# Patient Record
Sex: MALE | Race: AMERICAN INDIAN OR ALASKA NATIVE
[De-identification: names, ages, dates, MRNs, and addresses within clinical notes are randomized per-mention and may not be internally consistent; named-entity substitution may affect disease eponyms.]

---

## 2018-07-16 ENCOUNTER — HOSPITAL ENCOUNTER (EMERGENCY)
Dept: HOSPITAL 42 - ED | Age: 58
Discharge: HOME | End: 2018-07-16
Payer: COMMERCIAL

## 2018-07-16 VITALS — RESPIRATION RATE: 19 BRPM | TEMPERATURE: 97.9 F

## 2018-07-16 VITALS — SYSTOLIC BLOOD PRESSURE: 137 MMHG | DIASTOLIC BLOOD PRESSURE: 82 MMHG | HEART RATE: 55 BPM

## 2018-07-16 VITALS — OXYGEN SATURATION: 98 %

## 2018-07-16 DIAGNOSIS — R42: ICD-10-CM

## 2018-07-16 DIAGNOSIS — R51: ICD-10-CM

## 2018-07-16 DIAGNOSIS — I10: Primary | ICD-10-CM

## 2018-07-16 LAB
ALBUMIN SERPL-MCNC: 3.9 G/DL (ref 3–4.8)
ALBUMIN/GLOB SERPL: 1.3 {RATIO} (ref 1.1–1.8)
ALT SERPL-CCNC: 34 U/L (ref 7–56)
APPEARANCE UR: CLEAR
APTT BLD: 31.4 SECONDS (ref 25.1–36.5)
AST SERPL-CCNC: 34 U/L (ref 17–59)
BACTERIA #/AREA URNS HPF: (no result) /[HPF]
BASOPHILS # BLD AUTO: 0.03 K/MM3 (ref 0–2)
BASOPHILS NFR BLD: 0.5 % (ref 0–3)
BILIRUB UR-MCNC: NEGATIVE MG/DL
BUN SERPL-MCNC: 11 MG/DL (ref 7–21)
CALCIUM SERPL-MCNC: 9.1 MG/DL (ref 8.4–10.5)
CK MB SERPL-MCNC: 1.8 NG/ML (ref 0–3.6)
COLOR UR: (no result)
EOSINOPHIL # BLD: 0.1 10*3/UL (ref 0–0.7)
EOSINOPHIL NFR BLD: 0.8 % (ref 1.5–5)
EPI CELLS #/AREA URNS HPF: (no result) /HPF (ref 0–5)
ERYTHROCYTE [DISTWIDTH] IN BLOOD BY AUTOMATED COUNT: 12.3 % (ref 11.5–14.5)
GFR NON-AFRICAN AMERICAN: > 60
GLUCOSE UR STRIP-MCNC: NEGATIVE MG/DL
GRANULOCYTES # BLD: 3.2 10*3/UL (ref 1.4–6.5)
GRANULOCYTES NFR BLD: 50.7 % (ref 50–68)
HGB BLD-MCNC: 15.2 G/DL (ref 14–18)
INR PPP: 0.95 (ref 0.93–1.08)
LEUKOCYTE ESTERASE UR-ACNC: NEGATIVE LEU/UL
LYMPHOCYTES # BLD: 2.4 10*3/UL (ref 1.2–3.4)
LYMPHOCYTES NFR BLD AUTO: 38 % (ref 22–35)
MCH RBC QN AUTO: 32.9 PG (ref 25–35)
MCHC RBC AUTO-ENTMCNC: 35.6 G/DL (ref 31–37)
MCV RBC AUTO: 92.4 FL (ref 80–105)
MONOCYTES # BLD AUTO: 0.6 10*3/UL (ref 0.1–0.6)
MONOCYTES NFR BLD: 10 % (ref 1–6)
PH UR STRIP: 6 [PH] (ref 4.7–8)
PLATELET # BLD: 255 10^3/UL (ref 120–450)
PMV BLD AUTO: 9.1 FL (ref 7–11)
PROT UR STRIP-MCNC: NEGATIVE MG/DL
PROTHROMBIN TIME: 10.8 SECONDS (ref 9.4–12.5)
RBC # BLD AUTO: 4.62 10^6/UL (ref 3.5–6.1)
RBC # UR STRIP: (no result) /UL
RBC #/AREA URNS HPF: (no result) /HPF (ref 0–2)
SP GR UR STRIP: <= 1.005 (ref 1–1.03)
TROPONIN I SERPL-MCNC: < 0.01 NG/ML
UROBILINOGEN UR STRIP-ACNC: 0.2 E.U./DL
WBC # BLD AUTO: 6.3 10^3/UL (ref 4.5–11)
WBC #/AREA URNS HPF: NEGATIVE /HPF (ref 0–6)

## 2018-07-16 PROCEDURE — 85610 PROTHROMBIN TIME: CPT

## 2018-07-16 PROCEDURE — 85730 THROMBOPLASTIN TIME PARTIAL: CPT

## 2018-07-16 PROCEDURE — 96360 HYDRATION IV INFUSION INIT: CPT

## 2018-07-16 PROCEDURE — 85025 COMPLETE CBC W/AUTO DIFF WBC: CPT

## 2018-07-16 PROCEDURE — 99284 EMERGENCY DEPT VISIT MOD MDM: CPT

## 2018-07-16 PROCEDURE — 83615 LACTATE (LD) (LDH) ENZYME: CPT

## 2018-07-16 PROCEDURE — 84484 ASSAY OF TROPONIN QUANT: CPT

## 2018-07-16 PROCEDURE — 82553 CREATINE MB FRACTION: CPT

## 2018-07-16 PROCEDURE — 80053 COMPREHEN METABOLIC PANEL: CPT

## 2018-07-16 PROCEDURE — 93005 ELECTROCARDIOGRAM TRACING: CPT

## 2018-07-16 PROCEDURE — 70450 CT HEAD/BRAIN W/O DYE: CPT

## 2018-07-16 PROCEDURE — 81001 URINALYSIS AUTO W/SCOPE: CPT

## 2018-07-16 PROCEDURE — 82550 ASSAY OF CK (CPK): CPT

## 2018-07-16 NOTE — ED PDOC
Arrival/HPI





- General


Chief Complaint: High Blood Pressure


Time Seen by Provider: 07/16/18 18:43


Historian: Patient





- History of Present Illness


Narrative History of Present Illness (Text): 





07/16/18 19:43


59yo male with no pmhx who was referred to ED from Urgent care for dizziness, 

headache and elevated BP. Patient reports 2days history of  dizziness and 

headache. States he went to  today for the symptoms, and while there he was 

told his BP was elevated. He described dizziness, as lightheadedness. Denies 

focal weakness, nausea, vomiting, focal weakness, ripping/tearing upper back 

pain, visual changes, abdominal pain, chest pain, slurred speech, any other 

complaint.





Past Medical History





- Provider Review


Nursing Documentation Reviewed: Yes





- Infectious Disease


Hx of Infectious Diseases: None





- Cardiac


Hx Cardiac Disorders: No


Hx Hypertension: Yes





- Pulmonary


Hx Respiratory Disorders: No





- HEENT


Hx HEENT Disorder: No





- Endocrine/Metabolic


Hx Endocrine Disorders: No





- Hematological/Oncological


Hx Blood Disorders: No





- Integumentary


Hx Dermatological Disorder: No





- Musculoskeletal/Rheumatological


Hx Musculoskeletal Disorders: No





- Gastrointestinal


Hx Gastrointestinal Disorders: No





- Genitourinary/Gynecological


Hx Genitourinary Disorders: No





- Psychiatric


Hx Psychophysiologic Disorder: No


Hx Substance Use: No





- Surgical History


Other/Comment: abd surgery





- Anesthesia


Hx Anesthesia: Yes


Hx Anesthesia Reactions: No





Family/Social History





- Physician Review


Nursing Documentation Reviewed: Yes


Family/Social History: Unknown Family HX


Smoking Status: Unknown If Ever Smoked


Hx Alcohol Use: Yes


Frequency of alcohol use: Socially


Hx Substance Use: No





Allergies/Home Meds


Allergies/Adverse Reactions: 


Allergies





No Known Allergies Allergy (Verified 07/16/18 18:42)


 











Review of Systems





- Physician Review


All systems were reviewed & negative as marked: Yes





- Review of Systems


Constitutional: Normal


Eyes: Normal


ENT: Normal


Respiratory: Normal


Cardiovascular: Normal


Gastrointestinal: Normal


Genitourinary Male: Normal


Musculoskeletal: Normal


Skin: Normal


Neurological: Headache, Dizziness.  absent: Focal Weakness, Gait Changes, 

Speech Changes, Facial Droop


Endocrine: Normal


Hemo/Lymphatic: Normal


Psychiatric: Normal





Physical Exam


Vital Signs Reviewed: Yes


Vital Signs











  Temp Pulse Resp BP Pulse Ox


 


 07/16/18 20:54   55 L   137/82 


 


 07/16/18 18:55   64  18  174/70 H  98


 


 07/16/18 18:36  99 F  65  18  180/75 H  98











Temperature: Afebrile


Blood Pressure: Hypertensive


Pulse: Regular


Respiratory Rate: Normal


Appearance: Positive for: Well-Appearing, Non-Toxic, Comfortable


Pain Distress: None


Mental Status: Positive for: Alert and Oriented X 3





- Systems Exam


Head: Present: Atraumatic, Normocephalic


Pupils: Present: PERRL


Extroacular Muscles: Present: EOMI


Conjunctiva: Present: Normal


Mouth: Present: Moist Mucous Membranes


Neck: Present: Normal Range of Motion


Respiratory/Chest: Present: Clear to Auscultation, Good Air Exchange.  No: 

Respiratory Distress, Accessory Muscle Use


Cardiovascular: Present: Regular Rate and Rhythm, Normal S1, S2.  No: Murmurs


Abdomen: No: Tenderness, Distention, Peritoneal Signs


Back: Present: Normal Inspection


Upper Extremity: Present: Normal Inspection.  No: Cyanosis, Edema


Lower Extremity: Present: Normal Inspection.  No: Edema


Neurological: Present: GCS=15, CN II-XII Intact, Speech Normal, Motor Func 

Grossly Intact, Normal Sensory Function, Normal Cerebellar Funct, Norm Deep 

Tendon Reflexes, Gait Normal, Memory Normal, Normal 2Pt Descrimination, Other (

No focal neurological deficit)


Skin: Present: Warm, Dry, Normal Color.  No: Rashes


Psychiatric: Present: Alert, Oriented x 3, Normal Insight, Normal Concentration





Medical Decision Making


ED Course and Treatment: 





07/16/18 21:04


PT presented for stated history. He was neurologically intact in ED. His lab 

was unremarkable





EKG NSR with LVH @ 62bpm





He was treated in ED with medication and in re evaluation he notes that his 

headache resolved.





Head CT - Negative





His BP improved. Result was DW the pt and he was advised to f/u with his PMD/

Neuro for further outpt evaluation. Amlodipine rx was given for his pressure.





- Lab Interpretations


Lab Results: 








 07/16/18 19:10 





 07/16/18 19:10 





 Lab Results





07/16/18 19:10: Sodium 143, Potassium 4.2, Chloride 104, Carbon Dioxide 31, 

Anion Gap 13, BUN 11, Creatinine 0.8, Est GFR (African Amer) > 60, Est GFR (Non-

Af Amer) > 60, Random Glucose 106, Calcium 9.1, Total Bilirubin 0.4, AST 34, 

ALT 34, Alkaline Phosphatase 59, Lactate Dehydrogenase 661, Total Creatine 

Kinase 539 H, CK-MB (CK-2) 1.8, CK-MB (CK-2) % Cancelled, Troponin I < 0.01, 

Total Protein 6.9, Albumin 3.9, Globulin 3.0, Albumin/Globulin Ratio 1.3


07/16/18 19:10: Urine Color Light yellow, Urine Appearance Clear, Urine pH 6.0, 

Ur Specific Gravity <= 1.005, Urine Protein Negative, Urine Glucose (UA) 

Negative, Urine Ketones Negative, Urine Blood Trace-intact H, Urine Nitrate 

Negative, Urine Bilirubin Negative, Urine Urobilinogen 0.2, Ur Leukocyte 

Esterase Negative, Urine RBC 0 - 2, Urine WBC Negative, Ur Epithelial Cells None

, Urine Bacteria None


07/16/18 19:10: PT 10.8, INR 0.95, APTT 31.4


07/16/18 19:10: WBC 6.3, RBC 4.62, Hgb 15.2, Hct 42.7, MCV 92.4, MCH 32.9, MCHC 

35.6, RDW 12.3, Plt Count 255, MPV 9.1, Gran % 50.7, Lymph % (Auto) 38.0 H, 

Mono % (Auto) 10.0 H, Eos % (Auto) 0.8 L, Baso % (Auto) 0.5, Gran # 3.20, Lymph 

# (Auto) 2.4, Mono # (Auto) 0.6, Eos # (Auto) 0.1, Baso # (Auto) 0.03











- RAD Interpretation


Radiology Orders: 








07/16/18 18:49


HEAD W/O CONTRAST [CT] Stat 














- Medication Orders


Current Medication Orders: 











Discontinued Medications





Acetaminophen (Tylenol 325mg Tab)  650 mg PO STAT STA


   Stop: 07/16/18 18:50


   Last Admin: 07/16/18 19:06  Dose: 650 mg





MAR Pain/Vitals


 Document     07/16/18 19:06  OCS  (Rec: 07/16/18 19:07  OCS  [facility] [place]


     Pain Reassessment


      Is This A Pain ReAssessment?               No


     Sleep


      Is patient sleeping during reassessment?   No


     Presence of Pain


      Presence of Pain                           Yes


     Pain Scale Used


      Pain Scale Used                            Numeric


     Location


      Pain Location Body Site                    Chest


      Description                                Constant


      Aggravating Factors                        ADL's





Amlodipine Besylate (Norvasc)  5 mg PO STAT STA


   Stop: 07/16/18 19:54


   Last Admin: 07/16/18 20:54  Dose: 5 mg





MAR Pulse and Blood Pressure


 Document     07/16/18 20:54  OCS  (Rec: 07/16/18 20:54  OCS  [facility]


     Pulse


      Pulse Rate (60-90 beats/min)               55


     Blood Pressure


      Blood Pressure (100//90 mm Hg)       137/82





Metoclopramide HCl 10 mg/ (Sodium Chloride)  52 mls @ 200 mls/hr IV STAT STA


   Stop: 07/16/18 19:04


   Last Admin: 07/16/18 19:07  Dose: 200 mls/hr





eMAR Start Stop


 Document     07/16/18 19:07  OCS  (Rec: 07/16/18 19:08  OCS  [facility]


     Intravenous Solution


      Start Date                                 07/16/18


      Start Time                                 19:07


      End Date                                   07/16/18


      End time                                   19:22


      Total Infusion Time                        15





Sodium Chloride (Sodium Chloride 0.9%)  1,000 mls @ 999 mls/hr IV .Q1H1M STA


   Stop: 07/16/18 19:51


   Last Admin: 07/16/18 19:08  Dose: 999 mls/hr





eMAR Start Stop


 Document     07/16/18 19:08  OCS  (Rec: 07/16/18 19:09  OCS  [facility]


     Intravenous Solution


      Start Date                                 07/16/18


      Start Time                                 19:08


      End Date                                   07/16/18


      End time                                   20:09


      Total Infusion Time                        61














Disposition/Present on Arrival





- Present on Arrival


Any Indicators Present on Arrival: No


History of DVT/PE: No


History of Uncontrolled Diabetes: No


Urinary Catheter: No


History of Decub. Ulcer: No


History Surgical Site Infection Following: None





- Disposition


Have Diagnosis and Disposition been Completed?: Yes


Diagnosis: 


 Dizziness, Headache, Hypertension





Disposition: HOME/ ROUTINE


Disposition Time: 21:00


Patient Plan: Discharge


Condition: STABLE


Discharge Instructions (ExitCare):  Headache, Adult, High Blood Pressure in 

Adults, Controlling Your Blood Pressure Through Lifestyle, Dizziness, Nonvertigo

, (DC)


Additional Instructions: 


Follow up with your Doctor/Neurologist


Return to ED for any new or worsening symptoms


Prescriptions: 


amLODIPine [Norvasc] 5 mg PO DAILY #10 tab


Referrals: 


Shanell Yepez MD [Primary Care Provider] - Follow up with primary


Forms:  [employer] (English)

## 2018-07-17 NOTE — CT
Date of service: 



07/16/2018



PROCEDURE:  CT HEAD WITHOUT CONTRAST.



HISTORY:

headache/dizziness



COMPARISON:

None available. 



TECHNIQUE:

Axial computed tomography images were obtained through the head/brain 

without intravenous contrast.  



Radiation dose:



Total exam DLP = 1165 mGy-cm.



This CT exam was performed using one or more of the following dose 

reduction techniques: Automated exposure control, adjustment of the 

mA and/or kV according to patient size, and/or use of iterative 

reconstruction technique.



FINDINGS:



HEMORRHAGE:

No intracranial hemorrhage. 



BRAIN:

No mass effect or edema.  No atrophy or chronic microvascular 

ischemic changes.



VENTRICLES:

Unremarkable. No hydrocephalus. 



CALVARIUM:

Unremarkable.



PARANASAL SINUSES:

Unremarkable as visualized. No significant inflammatory changes.



MASTOID AIR CELLS:

Unremarkable as visualized. No inflammatory changes.



OTHER FINDINGS:

The report concurs with the preliminary [facility] report



IMPRESSION:

No acute finding

## 2018-07-18 ENCOUNTER — HOSPITAL ENCOUNTER (OUTPATIENT)
Dept: HOSPITAL 31 - C.ER | Age: 58
Setting detail: OBSERVATION
LOS: 2 days | Discharge: HOME | End: 2018-07-20
Attending: INTERNAL MEDICINE | Admitting: INTERNAL MEDICINE
Payer: COMMERCIAL

## 2018-07-18 VITALS — RESPIRATION RATE: 20 BRPM

## 2018-07-18 DIAGNOSIS — K26.9: ICD-10-CM

## 2018-07-18 DIAGNOSIS — Z79.899: ICD-10-CM

## 2018-07-18 DIAGNOSIS — Z82.3: ICD-10-CM

## 2018-07-18 DIAGNOSIS — Z79.82: ICD-10-CM

## 2018-07-18 DIAGNOSIS — R42: Primary | ICD-10-CM

## 2018-07-18 DIAGNOSIS — I63.9: ICD-10-CM

## 2018-07-18 DIAGNOSIS — Z87.11: ICD-10-CM

## 2018-07-18 DIAGNOSIS — I10: ICD-10-CM

## 2018-07-18 LAB
ALBUMIN SERPL-MCNC: 4.6 G/DL (ref 3.5–5)
ALBUMIN/GLOB SERPL: 1.5 {RATIO} (ref 1–2.1)
ALT SERPL-CCNC: 37 U/L (ref 21–72)
AST SERPL-CCNC: 44 U/L (ref 17–59)
BASOPHILS # BLD AUTO: 0 K/UL (ref 0–0.2)
BASOPHILS NFR BLD: 0.7 % (ref 0–2)
BILIRUB UR-MCNC: NEGATIVE MG/DL
BNP SERPL-MCNC: 63.7 PG/ML (ref 0–900)
BUN SERPL-MCNC: 6 MG/DL (ref 9–20)
CALCIUM SERPL-MCNC: 9.7 MG/DL (ref 8.6–10.4)
EOSINOPHIL # BLD AUTO: 0.1 K/UL (ref 0–0.7)
EOSINOPHIL NFR BLD: 1.1 % (ref 0–4)
ERYTHROCYTE [DISTWIDTH] IN BLOOD BY AUTOMATED COUNT: 12.5 % (ref 11.5–14.5)
GFR NON-AFRICAN AMERICAN: > 60
GLUCOSE UR STRIP-MCNC: NORMAL MG/DL
HDLC SERPL-MCNC: 43 MG/DL (ref 30–70)
HGB BLD-MCNC: 16.7 G/DL (ref 12–18)
LDLC SERPL-MCNC: 138 MG/DL (ref 0–129)
LEUKOCYTE ESTERASE UR-ACNC: (no result) LEU/UL
LYMPHOCYTES # BLD AUTO: 3.1 K/UL (ref 1–4.3)
LYMPHOCYTES NFR BLD AUTO: 49.2 % (ref 20–40)
MCH RBC QN AUTO: 33.6 PG (ref 27–31)
MCHC RBC AUTO-ENTMCNC: 34.8 G/DL (ref 33–37)
MCV RBC AUTO: 96.6 FL (ref 80–94)
MONOCYTES # BLD: 0.8 K/UL (ref 0–0.8)
MONOCYTES NFR BLD: 12.3 % (ref 0–10)
NEUTROPHILS # BLD: 2.3 K/UL (ref 1.8–7)
NEUTROPHILS NFR BLD AUTO: 36.7 % (ref 50–75)
NRBC BLD AUTO-RTO: 0.1 % (ref 0–2)
PH UR STRIP: 6 [PH] (ref 5–8)
PLATELET # BLD: 306 K/UL (ref 130–400)
PMV BLD AUTO: 7.8 FL (ref 7.2–11.7)
PROT UR STRIP-MCNC: NEGATIVE MG/DL
RBC # BLD AUTO: 4.96 MIL/UL (ref 4.4–5.9)
RBC # UR STRIP: NEGATIVE /UL
SP GR UR STRIP: 1 (ref 1–1.03)
UROBILINOGEN UR-MCNC: NORMAL MG/DL (ref 0.2–1)
WBC # BLD AUTO: 6.4 K/UL (ref 4.8–10.8)

## 2018-07-18 PROCEDURE — 70450 CT HEAD/BRAIN W/O DYE: CPT

## 2018-07-18 PROCEDURE — 97162 PT EVAL MOD COMPLEX 30 MIN: CPT

## 2018-07-18 PROCEDURE — 36415 COLL VENOUS BLD VENIPUNCTURE: CPT

## 2018-07-18 PROCEDURE — 84443 ASSAY THYROID STIM HORMONE: CPT

## 2018-07-18 PROCEDURE — 80061 LIPID PANEL: CPT

## 2018-07-18 PROCEDURE — 70496 CT ANGIOGRAPHY HEAD: CPT

## 2018-07-18 PROCEDURE — 85025 COMPLETE CBC W/AUTO DIFF WBC: CPT

## 2018-07-18 PROCEDURE — 81001 URINALYSIS AUTO W/SCOPE: CPT

## 2018-07-18 PROCEDURE — 97116 GAIT TRAINING THERAPY: CPT

## 2018-07-18 PROCEDURE — 97165 OT EVAL LOW COMPLEX 30 MIN: CPT

## 2018-07-18 PROCEDURE — 84100 ASSAY OF PHOSPHORUS: CPT

## 2018-07-18 PROCEDURE — 97530 THERAPEUTIC ACTIVITIES: CPT

## 2018-07-18 PROCEDURE — 93005 ELECTROCARDIOGRAM TRACING: CPT

## 2018-07-18 PROCEDURE — 70551 MRI BRAIN STEM W/O DYE: CPT

## 2018-07-18 PROCEDURE — 71045 X-RAY EXAM CHEST 1 VIEW: CPT

## 2018-07-18 PROCEDURE — 99285 EMERGENCY DEPT VISIT HI MDM: CPT

## 2018-07-18 PROCEDURE — 84484 ASSAY OF TROPONIN QUANT: CPT

## 2018-07-18 PROCEDURE — 80053 COMPREHEN METABOLIC PANEL: CPT

## 2018-07-18 PROCEDURE — 70498 CT ANGIOGRAPHY NECK: CPT

## 2018-07-18 PROCEDURE — 83735 ASSAY OF MAGNESIUM: CPT

## 2018-07-18 PROCEDURE — 85378 FIBRIN DEGRADE SEMIQUANT: CPT

## 2018-07-18 PROCEDURE — 83880 ASSAY OF NATRIURETIC PEPTIDE: CPT

## 2018-07-18 PROCEDURE — 93306 TTE W/DOPPLER COMPLETE: CPT

## 2018-07-18 NOTE — CP.PCM.HP
History of Present Illness





- History of Present Illness


History of Present Illness: 





H&P Dr. Singer's Hospitalist Service





CC: "Dizziness and elevated blood pressure"





HPI:58 year old male with a past medical history of hypertension and duodenal 

ulcer (s/p abdominal surgery) who comes in today complaining of dizziness and 

nausea since Sunday evening.  The patient states he was at rest initially when 

the dizziness began.  He also reports sitting up exacerbating the symptoms.  He 

denies any alleviating or modifying factors in regards to the dizziness.  

Initially when he first had the dizziness he went to an Urgent care were they 

referred him to the [facility] for further workup after his blood 

pressure came back elevated.  After the workup at [facility] he was 

referred to the Cardiologist Dr Bates for further workup as an outpatient. 

Of note, the patient was at his Cardiologist Dr. Hsieh in the waiting room 

when he "blacked out" and can't recall the events leading up to this.  In 

conjunction he also reports a posterior headache.  He denies any chest pain, 

shortness of breath, orthopnea, dyspnea, fevers, chills, cough, changes in 

vision, or any other complaints.





PMD: Dr. JODY Gross


Cardiologist: Dr. Hsieh





Past medical history: Hypertension, duodenal ulcer (s/p abdominal surgery)


Medications: Amlodipine 5mg PO Daily, Multivitamin, Baby aspirin 81mg PO Daily


Surgical history: Abdominal surgery ( 1985: [place])


Family history: Mom: CHANA @75 yrs, Brother : CHANA @49 yrs old


Social history: Admits social drinking Friday's and Saturday's. Denies smoking. 

Denies Illicit drug use. Works for [place] [employer]











Present on Admission





- Present on Admission


Any Indicators Present on Admission: No





Review of Systems





- Constitutional


Constitutional: absent: Daytime Sleepiness, Headache, Snoring, Weakness





- EENT


Eyes: absent: Blurred Vision, Discharge, Loss of Peripheral Vision, Sees Flashes

, Loss of Vision


Ears: Disequilibrium, Dizziness.  absent: Decreased Hearing, Ear Discharge, Ear 

Pain


Nose/Mouth/Throat: absent: Nasal Congestion, Nose Pain, Bleeding Gums, Facial 

Pain





- Cardiovascular


Cardiovascular: Syncope.  absent: Chest Pain, Claudication, Irregular Heart 

Rhythm, Leg Edema, Palpitations, Pedal Edema





- Respiratory


Respiratory: absent: Cough, Dyspnea, Hemoptysis, Snoring, Pain on Inspiration, 

Change in Mucous Color





- Gastrointestinal


Gastrointestinal: absent: Belching, Change in Stool Character, Fecal 

Incontinence, Melena, Nausea





- Musculoskeletal


Musculoskeletal: absent: Abnormal Gait, Arthralgias, Muscle Weakness, Myalgias, 

Stiffness, Tingling





- Neurological


Neurological: Syncope, Vertigo.  absent: Abnormal Hearing, Burning Sensations, 

Numbness, Loss of Vision, Restless Legs, Tingling





- Psychiatric


Psychiatric: absent: Behavioral Changes, Depression, Panic Attacks, Tactile 

Hallucinations





- Endocrine


Endocrine: absent: Polydipsia, Polyphagia, Polyuria





- Hematologic/Lymphatic


Hematologic: absent: Easy Bleeding, Easy Bruising





Past Patient History





- Past Social History


Smoking Status: Never Smoked





- CARDIAC


Hx Hypertension: Yes





- GASTROINTESTINAL


Hx Ulcer: Yes





- PSYCHIATRIC


Hx Substance Use: No





- SURGICAL HISTORY


Other/Comment: "ULCER SURGERY"





- ANESTHESIA


Hx Anesthesia: Yes


Hx Anesthesia Reactions: No





Meds


Allergies/Adverse Reactions: 


 Allergies











Allergy/AdvReac Type Severity Reaction Status Date / Time


 


ibuprofen [From Advil] Allergy   Verified 07/18/18 11:49














Physical Exam





- Head Exam


Head Exam: ATRAUMATIC, NORMAL INSPECTION, NORMOCEPHALIC





- Eye Exam


Eye Exam: EOMI, Normal appearance, PERRL


Pupil Exam: NORMAL ACCOMODATION





- ENT Exam


ENT Exam: Mucous Membranes Moist, Normal Oropharynx





- Respiratory Exam


Respiratory Exam: Clear to Auscultation Bilateral, NORMAL BREATHING PATTERN





- Cardiovascular Exam


Cardiovascular Exam: REGULAR RHYTHM, +S1, +S2.  absent: Tachycardia, RRR, Rubs, 

+S4, Systolic Murmur





- GI/Abdominal Exam


GI & Abdominal Exam: Normal Bowel Sounds, Soft.  absent: Distended, Firm, 

Hypoactive Bowel Sounds





- Extremities Exam


Extremities exam: Positive for: normal inspection.  Negative for: full ROM, 

pedal edema





- Back Exam


Back exam: NORMAL INSPECTION.  absent: CVA tenderness (L), CVA tenderness (R), 

paraspinal tenderness





- Neurological Exam


Neurological exam: Alert, CN II-XII Intact, Oriented x3





- Psychiatric Exam


Psychiatric exam: Normal Affect, Normal Mood





- Skin


Skin Exam: Dry, Intact, Normal Color





Results





- Vital Signs


Recent Vital Signs: 





 Last Vital Signs











Temp  98.5 F   07/18/18 15:19


 


Pulse  64   07/18/18 15:19


 


Resp  18   07/18/18 15:19


 


BP  145/79   07/18/18 15:19


 


Pulse Ox  98   07/18/18 15:19














- Labs


Result Diagrams: 


 07/18/18 12:16





 07/18/18 12:16


Labs: 





 Laboratory Results - last 24 hr











  07/18/18 07/18/18 07/18/18





  12:16 12:16 12:16


 


WBC  6.4  


 


RBC  4.96  


 


Hgb  16.7  


 


Hct  47.9  


 


MCV  96.6 H  


 


MCH  33.6 H  


 


MCHC  34.8  


 


RDW  12.5  


 


Plt Count  306  


 


MPV  7.8  


 


Neut % (Auto)  36.7 L  


 


Lymph % (Auto)  49.2 H  


 


Mono % (Auto)  12.3 H  


 


Eos % (Auto)  1.1  


 


Baso % (Auto)  0.7  


 


Neut # (Auto)  2.3  


 


Lymph # (Auto)  3.1  


 


Mono # (Auto)  0.8  


 


Eos # (Auto)  0.1  


 


Baso # (Auto)  0.0  


 


D-Dimer, Quantitative   < 200 


 


Sodium    142


 


Potassium    3.8


 


Chloride    101


 


Carbon Dioxide    30


 


Anion Gap    15


 


BUN    6 L


 


Creatinine    0.7 L


 


Est GFR ( Amer)    > 60


 


Est GFR (Non-Af Amer)    > 60


 


Random Glucose    129 H


 


Calcium    9.7


 


Total Bilirubin    0.3


 


AST    44


 


ALT    37


 


Alkaline Phosphatase    70


 


Troponin I    < 0.0120


 


NT-Pro-B Natriuret Pep    63.7


 


Total Protein    7.7


 


Albumin    4.6


 


Globulin    3.1


 


Albumin/Globulin Ratio    1.5


 


Urine Color   


 


Urine Clarity   


 


Urine pH   


 


Ur Specific Gravity   


 


Urine Protein   


 


Urine Glucose (UA)   


 


Urine Ketones   


 


Urine Blood   


 


Urine Nitrate   


 


Urine Bilirubin   


 


Urine Urobilinogen   


 


Ur Leukocyte Esterase   


 


Urine WBC (Auto)   


 


Urine RBC (Auto)   














  07/18/18





  12:34


 


WBC 


 


RBC 


 


Hgb 


 


Hct 


 


MCV 


 


MCH 


 


MCHC 


 


RDW 


 


Plt Count 


 


MPV 


 


Neut % (Auto) 


 


Lymph % (Auto) 


 


Mono % (Auto) 


 


Eos % (Auto) 


 


Baso % (Auto) 


 


Neut # (Auto) 


 


Lymph # (Auto) 


 


Mono # (Auto) 


 


Eos # (Auto) 


 


Baso # (Auto) 


 


D-Dimer, Quantitative 


 


Sodium 


 


Potassium 


 


Chloride 


 


Carbon Dioxide 


 


Anion Gap 


 


BUN 


 


Creatinine 


 


Est GFR ( Amer) 


 


Est GFR (Non-Af Amer) 


 


Random Glucose 


 


Calcium 


 


Total Bilirubin 


 


AST 


 


ALT 


 


Alkaline Phosphatase 


 


Troponin I 


 


NT-Pro-B Natriuret Pep 


 


Total Protein 


 


Albumin 


 


Globulin 


 


Albumin/Globulin Ratio 


 


Urine Color  Straw


 


Urine Clarity  Clear


 


Urine pH  6.0


 


Ur Specific Gravity  1.004


 


Urine Protein  Negative


 


Urine Glucose (UA)  Normal


 


Urine Ketones  Negative


 


Urine Blood  Negative


 


Urine Nitrate  Negative


 


Urine Bilirubin  Negative


 


Urine Urobilinogen  Normal


 


Ur Leukocyte Esterase  Neg


 


Urine WBC (Auto)  < 1


 


Urine RBC (Auto)  1














Assessment & Plan





- Assessment and Plan (Free Text)


Assessment: 





58 year old male with a past medical history of hypertension and duodenal ulcer 

who is being admitted for syncope and elevated blood pressure.


Plan: 





1.?Syncope/Dizziness


Cardiac vs. Neuological


-Echo ordered .Will follow up results


-Orthostatics ordered .Will f/u with results.


-Neurology Dr. Sapp consulted. Help appreciated.


-Cardiology Dr. Hsieh consulted. Help appreciated.


-Physical therapy eval and treat. Will f/u with results.





2. Uncontrolled Hypertension


-174/91 upon admission.


-Home medication Amlodipine increased to 10mg PO Daily.


-Hydrochlorothiazide 12.5mg PO Daily started.


-Cardiology Dr. Hsieh consulted. Help appreciated.


-Will continue to monitor vitals.





PPX


-Protonix


-Heparin 





Plan discussed with Attending Dr. Escobar.





Dano Messer, PGY-2

## 2018-07-18 NOTE — CP.PCM.CON
History of Present Illness





- History of Present Illness


History of Present Illness: 





Neurology Consultation Note:





Mr. Rogel is a 58-year-old man with a past medical history of hypertension and 

duodenal ulcer (s/p abdominal surgery), who has been having nauea/dizziness, 

and had a syncopal episode at his cardiologists office.  He was sent to the ED 

for further evaluation.  An MRI of the brain was done and showed multiple 

cerebellar infarcts.  He was hypertensive.  Neurology was consulted to assist 

with the management and care. 





Review of Systems





- Review of Systems


All systems: reviewed and no additional remarkable complaints except





Past Patient History





- Past Social History


Smoking Status: Never Smoked





- CARDIAC


Hx Hypertension: Yes





- GASTROINTESTINAL


Hx Ulcer: Yes





- PSYCHIATRIC


Hx Substance Use: No





- SURGICAL HISTORY


Other/Comment: "ULCER SURGERY"





- ANESTHESIA


Hx Anesthesia: Yes


Hx Anesthesia Reactions: No





Meds


Allergies/Adverse Reactions: 


 Allergies











Allergy/AdvReac Type Severity Reaction Status Date / Time


 


ibuprofen [From Advil] Allergy   Verified 07/18/18 11:49














- Medications


Medications: 


 Current Medications





Aspirin (Ecotrin)  81 mg PO DAILY ROYAL


Heparin Sodium (Porcine) (Heparin)  5,000 units SC Q12 ROYAL


Lactated Ringer's (Lactated Ringer's)  1,000 mls @ 100 mls/hr IV .Q10H ROYAL


   Last Admin: 07/18/18 18:33 Dose:  100 mls/hr


Pantoprazole Sodium (Protonix Inj)  40 mg IVP DAILY ROAYL


Rosuvastatin Calcium (Crestor)  20 mg PO HS ROYAL











Physical Exam





- Neurological Exam


Neurological exam: Abnormal Gait, Alert, CN II-XII Intact, Oriented x3, 

Reflexes Normal


Additional comments: 





Wide-based ataxic gait and slight ataxia on FTN and HTS bilaterally. NIHSS = 1





Results





- Vital Signs


Recent Vital Signs: 


 Last Vital Signs











Temp  97.8 F   07/18/18 16:32


 


Pulse  61   07/18/18 16:32


 


Resp  20   07/18/18 16:32


 


BP  160/80 H  07/18/18 16:32


 


Pulse Ox  98   07/18/18 16:37














- Labs


Result Diagrams: 


 07/18/18 12:16





 07/18/18 12:16


Labs: 


 Laboratory Results - last 24 hr











  07/18/18 07/18/18 07/18/18





  12:16 12:16 12:16


 


WBC  6.4  


 


RBC  4.96  


 


Hgb  16.7  


 


Hct  47.9  


 


MCV  96.6 H  


 


MCH  33.6 H  


 


MCHC  34.8  


 


RDW  12.5  


 


Plt Count  306  


 


MPV  7.8  


 


Neut % (Auto)  36.7 L  


 


Lymph % (Auto)  49.2 H  


 


Mono % (Auto)  12.3 H  


 


Eos % (Auto)  1.1  


 


Baso % (Auto)  0.7  


 


Neut # (Auto)  2.3  


 


Lymph # (Auto)  3.1  


 


Mono # (Auto)  0.8  


 


Eos # (Auto)  0.1  


 


Baso # (Auto)  0.0  


 


D-Dimer, Quantitative   < 200 


 


Sodium    142


 


Potassium    3.8


 


Chloride    101


 


Carbon Dioxide    30


 


Anion Gap    15


 


BUN    6 L


 


Creatinine    0.7 L


 


Est GFR ( Amer)    > 60


 


Est GFR (Non-Af Amer)    > 60


 


Random Glucose    129 H


 


Calcium    9.7


 


Phosphorus   


 


Magnesium   


 


Total Bilirubin    0.3


 


AST    44


 


ALT    37


 


Alkaline Phosphatase    70


 


Troponin I    < 0.0120


 


NT-Pro-B Natriuret Pep    63.7


 


Total Protein    7.7


 


Albumin    4.6


 


Globulin    3.1


 


Albumin/Globulin Ratio    1.5


 


Triglycerides   


 


Cholesterol   


 


LDL Cholesterol Direct   


 


HDL Cholesterol   


 


Urine Color   


 


Urine Clarity   


 


Urine pH   


 


Ur Specific Gravity   


 


Urine Protein   


 


Urine Glucose (UA)   


 


Urine Ketones   


 


Urine Blood   


 


Urine Nitrate   


 


Urine Bilirubin   


 


Urine Urobilinogen   


 


Ur Leukocyte Esterase   


 


Urine WBC (Auto)   


 


Urine RBC (Auto)   














  07/18/18 07/18/18





  12:16 12:34


 


WBC  


 


RBC  


 


Hgb  


 


Hct  


 


MCV  


 


MCH  


 


MCHC  


 


RDW  


 


Plt Count  


 


MPV  


 


Neut % (Auto)  


 


Lymph % (Auto)  


 


Mono % (Auto)  


 


Eos % (Auto)  


 


Baso % (Auto)  


 


Neut # (Auto)  


 


Lymph # (Auto)  


 


Mono # (Auto)  


 


Eos # (Auto)  


 


Baso # (Auto)  


 


D-Dimer, Quantitative  


 


Sodium  


 


Potassium  


 


Chloride  


 


Carbon Dioxide  


 


Anion Gap  


 


BUN  


 


Creatinine  


 


Est GFR ( Amer)  


 


Est GFR (Non-Af Amer)  


 


Random Glucose  


 


Calcium  


 


Phosphorus  3.6 


 


Magnesium  2.0 


 


Total Bilirubin  


 


AST  


 


ALT  


 


Alkaline Phosphatase  


 


Troponin I  


 


NT-Pro-B Natriuret Pep  


 


Total Protein  


 


Albumin  


 


Globulin  


 


Albumin/Globulin Ratio  


 


Triglycerides  138 


 


Cholesterol  224 H 


 


LDL Cholesterol Direct  138 H 


 


HDL Cholesterol  43 


 


Urine Color   Straw


 


Urine Clarity   Clear


 


Urine pH   6.0


 


Ur Specific Gravity   1.004


 


Urine Protein   Negative


 


Urine Glucose (UA)   Normal


 


Urine Ketones   Negative


 


Urine Blood   Negative


 


Urine Nitrate   Negative


 


Urine Bilirubin   Negative


 


Urine Urobilinogen   Normal


 


Ur Leukocyte Esterase   Neg


 


Urine WBC (Auto)   < 1


 


Urine RBC (Auto)   1














Assessment & Plan


(1) Cerebellar stroke


Assessment and Plan: 


The appearance of the stroke could be cardioembolic, or vessel-to-vessel 

emboli.  I recommend the following for work-up and management:





1. Telemetry


2. CTA of the head/neck


3. Echocardiogram with bubble study


4. Aspirin 81 mg now and daily


5. Crestor to maintain LDL< 70


6. Fluids with NS at 100 mL/hr


7. Permissive hypertension (only treat BP that is higher than 220/110 mm Hg for 

the next 24 hours)


8. PT/OT eval and treatment


9. DVT Px


10. Case management consult


11. Cardiology consult





Thank you. 


Status: Acute   Priority: High

## 2018-07-18 NOTE — MRI
Date of service: 



07/18/2018



PROCEDURE:  MRI BRAIN WITHOUT CONTRAST



HISTORY:

Severe dizziness dizziness preventing pt ambulating .



COMPARISON:

Comparison made with prior CT scan brain earlier same day 



TECHNIQUE:

Multiplanar, multisequence MR images of the brain were obtained 

without intravenous contrast enhancement.



FINDINGS:



HEMORRHAGE:

No acute parenchymal, subarachnoid nor extra-axial hemorrhage. No 

evidence of hemosiderin deposition is identified on gradient echo 

weighted sequence.



DWI:

There are multiple tiny foci of restricted diffusion seen scattered 

throughout both inferior cerebellar hemispheres consistent with tiny 

acute/subacute infarcts. Additionally, there appears to be some very 

minimal faint acute/subacute ischemic changes within the posterior 

occipital parietal cortical watershed zones (possibly reversible) . 



BRAIN PARENCHYMA:

No obvious parenchymal nor extra-axial masses or collections seen on 

this noncontrast study.



Ventricular and sulcal size are within range of normal for this 

patient's stated age.



VENTRICLES:

Unremarkable. No hydrocephalus.



CRANIUM:

Unremarkable.



ORBITS:

Chronic fracture deformity left lamina papyracea. .



PARANASAL SINUSES/MASTOIDS:

There is a small focus of polypoid like mucosal thickening few 

bilateral ethmoid air cells.. Minimal mucosal thickening seen within 

inferior aspect frontal sinus 



VASCULAR SYSTEM:

Visualized major vascular flow voids at skull base patent.



OTHER FINDINGS:

None. 



IMPRESSION:

Findings are consistent with a tiny acute/subacute infarct seen 

scattered throughout both inferior cerebellar hemispheres and 

suspected minor possibly faint acute/subacute ischemic changes 

scattered about cortical/subcortical region of the occipito parietal 

watershed zones bilaterally (possibly reversible). Note that these 

findings were discussed with 6 Siria Garcia at approximately 

5:20 p.m. with written down and read back verification. 



No acute intracranial hemorrhage.

## 2018-07-18 NOTE — C.PDOC
History Of Present Illness


57 y/o male brought to ED by EMS s/p dizziness episode while at cardiologist 

office. Patient states he was referred to cardiologist by PMD for uncontrolled 

high blood pressure. Patient states he has been compliant with HTN medication 

given at [facility] 1 week ago but blood pressure has continued to be high. Patient 

admits he has similar dizziness episode 2am this morning that resolved on its 

own, admits to nausea, vomiting and headache. Patient denies fever, chills, 

chest pain, sob or any other complaints at this time. 


Time Seen by Provider: 07/18/18 11:42


Chief Complaint (Nursing): Dizziness/Lightheaded


History Per: Patient


History/Exam Limitations: no limitations


Onset/Duration Of Symptoms: Hrs


Current Symptoms Are (Timing): Still Present


Activity At Onset Of Symptoms: Sitting





Past Medical History


Reviewed: Historical Data, Nursing Documentation, Vital Signs


Vital Signs: 


 Last Vital Signs











Temp  98.4 F   07/18/18 11:42


 


Pulse  56 L  07/18/18 13:26


 


Resp  16   07/18/18 13:26


 


BP  174/91 H  07/18/18 13:26


 


Pulse Ox  96   07/18/18 14:10














- Medical History


PMH: HTN


Surgical History: No Surg Hx


Family History: States: No Known Family Hx





- Social History


Hx Alcohol Use: Yes


Hx Substance Use: No





- Immunization History


Hx Tetanus Toxoid Vaccination: No


Hx Influenza Vaccination: No


Hx Pneumococcal Vaccination: No





Review Of Systems


Except As Marked, All Systems Reviewed And Found Negative.


Gastrointestinal: Positive for: Nausea, Vomiting


Neurological: Positive for: Headache, Dizziness





Physical Exam





- Physical Exam


Appears: Non-toxic, No Acute Distress


Skin: Warm, Dry, No Rash


Head: Atraumatic, Normacephalic


Eye(s): bilateral: Normal Inspection (No nystagmus)


Oral Mucosa: Moist


Neck: Normal ROM, Supple


Cardiovascular: Rhythm Regular


Respiratory: Normal Breath Sounds, No Rales, No Rhonchi, No Wheezing


Gastrointestinal/Abdominal: Soft, No Tenderness, No Guarding, No Rebound


Extremity: Normal ROM, Capillary Refill (<2 seconds)


Neurological/Psych: Oriented x3, Normal Speech, Normal Cognition, Normal Motor, 

Normal Sensation





ED Course And Treatment





- Laboratory Results


Result Diagrams: 


 07/18/18 12:16





 07/18/18 12:16


O2 Sat by Pulse Oximetry: 96 (RA)


Pulse Ox Interpretation: Normal





Medical Decision Making


Medical Decision Making: 


Assessment: Dizziness





Plan: Head CT, Blood work. EKG, CXR ordered. Meclizine and Sudafed administered 





dizziness/htn - patient with ongoing symptoms. Will admit to tele observation 





1409


Patient admitted under hospitalist for dizziness, hypertension.





Disposition


Discussed With Dr.: Royal Escobar


Doctor Will See Patient In The: Hospital


Counseled Patient/Family Regarding: Studies Performed, Diagnosis





- Disposition


Disposition Time: 14:10


Condition: FAIR


Forms:  [facility] (English)





- Clinical Impression


Clinical Impression: 


 Hypertension, Dizziness








- Scribe Statement


The provider has reviewed the documentation as recorded by the Joeibkarena Calvo





All medical record entries made by the Joeibkarena were at my direction and 

personally dictated by me. I have reviewed the chart and agree that the record 

accurately reflects my personal performance of the history, physical exam, 

medical decision making, and the department course for this patient. I have 

also personally directed, reviewed, and agree with the discharge instructions 

and disposition.

## 2018-07-18 NOTE — CT
Date of service: 



07/18/2018



PROCEDURE:  CT HEAD WITHOUT CONTRAST.



HISTORY:

dizziness



COMPARISON:

None available. 



TECHNIQUE:

Axial computed tomography images were obtained through the head/brain 

without intravenous contrast.  



Radiation dose:



Total exam DLP = 835.36 mGy-cm.



This CT exam was performed using one or more of the following dose 

reduction techniques: Automated exposure control, adjustment of the 

mA and/or kV according to patient size, and/or use of iterative 

reconstruction technique.



FINDINGS:



HEMORRHAGE:

No intracranial hemorrhage. 



BRAIN:

Normal gray-white matter differentiation and density are appreciated 

throughout the cerebrum and cerebellum with the brainstem appearing 

unremarkable as well.  There is no mass effect.  There is no 

suspicious extra-axial fluid collection and the midline brain anatomy 

appears remarkable only for a dense calcification at the anterior 

falx. 



VENTRICLES:

Unremarkable. No hydrocephalus. 



CALVARIUM:

Unremarkable.



PARANASAL SINUSES:

Unremarkable as visualized. No significant inflammatory changes.



MASTOID AIR CELLS:

Unremarkable as visualized. No inflammatory changes.



OTHER FINDINGS:

None.



IMPRESSION:

No definite acute intracranial findings as discussed above. Follow up 

CT or MRI are available if clinically warranted.

## 2018-07-19 LAB
ALBUMIN SERPL-MCNC: 3.6 G/DL (ref 3.5–5)
ALBUMIN/GLOB SERPL: 1.3 {RATIO} (ref 1–2.1)
ALT SERPL-CCNC: 35 U/L (ref 21–72)
AST SERPL-CCNC: 34 U/L (ref 17–59)
BASOPHILS # BLD AUTO: 0.1 K/UL (ref 0–0.2)
BASOPHILS NFR BLD: 1 % (ref 0–2)
BUN SERPL-MCNC: 9 MG/DL (ref 9–20)
CALCIUM SERPL-MCNC: 9.3 MG/DL (ref 8.6–10.4)
EOSINOPHIL # BLD AUTO: 0.1 K/UL (ref 0–0.7)
EOSINOPHIL NFR BLD: 1.6 % (ref 0–4)
ERYTHROCYTE [DISTWIDTH] IN BLOOD BY AUTOMATED COUNT: 12.6 % (ref 11.5–14.5)
GFR NON-AFRICAN AMERICAN: > 60
HGB BLD-MCNC: 15.8 G/DL (ref 12–18)
LYMPHOCYTES # BLD AUTO: 2.8 K/UL (ref 1–4.3)
LYMPHOCYTES NFR BLD AUTO: 43.5 % (ref 20–40)
MCH RBC QN AUTO: 33.1 PG (ref 27–31)
MCHC RBC AUTO-ENTMCNC: 34.7 G/DL (ref 33–37)
MCV RBC AUTO: 95.6 FL (ref 80–94)
MONOCYTES # BLD: 0.6 K/UL (ref 0–0.8)
MONOCYTES NFR BLD: 9.5 % (ref 0–10)
NEUTROPHILS # BLD: 2.8 K/UL (ref 1.8–7)
NEUTROPHILS NFR BLD AUTO: 44.4 % (ref 50–75)
NRBC BLD AUTO-RTO: 0.1 % (ref 0–2)
PLATELET # BLD: 291 K/UL (ref 130–400)
PMV BLD AUTO: 7.7 FL (ref 7.2–11.7)
RBC # BLD AUTO: 4.76 MIL/UL (ref 4.4–5.9)
WBC # BLD AUTO: 6.4 K/UL (ref 4.8–10.8)

## 2018-07-19 RX ADMIN — PANTOPRAZOLE SODIUM SCH MG: 40 TABLET, DELAYED RELEASE ORAL at 09:26

## 2018-07-19 NOTE — CP.PCM.CON
History of Present Illness





- History of Present Illness


History of Present Illness: 





I was asked to see patient by the medical team.





Patient is a 58 year old male with PMH HTN who presents with severe dizziness, 

nausea and vomiting.  The patient initially was seen in an urgent care for 

hypertensive urgency.  he was prescribed medical therapy and was scheduled for 

follow up.  He developed severe dizziness while waiting in the waiting room.  

He was admitted and found to have a cerebellar CVA.  he denies chest pain or 

palpitations.  





Review of Systems





- Constitutional


Constitutional: absent: As Per HPI, Anorexia, Chills, Daytime Sleepiness, 

Excessive Sweating, Fatigue, Fever, Frequent Falls, Headache, Increased Appetite

, Lethargy, Malaise, Night Sweats, Snoring, Sleep Apnea, Weight Gain, Weight 

Loss, Weakness, Other





- EENT


Eyes: absent: As Per HPI, Blind Spots, Blurred Vision, Change in Vision, 

Decreased Night Vision, Diplopia, Discharge, Dry Eye, Exophthalmos, Floaters, 

Irritation, Itchy Eyes, Loss of Peripheral Vision, Pain, Photophobia, Requires 

Corrective Lenses, Sees Flashes, Spots in Vision, Tunnel Vision, Other Visual 

Disturbances, Loss of Vision, Other


Ears: absent: As Per HPI, Decreased Hearing, Ear Discharge, Ear Pain, Tinnitus, 

Abnormal Hearing, Disequilibrium, Dizziness, Other


Nose/Mouth/Throat: absent: As Per HPI, Epistaxis, Nasal Congestion, Nasal 

Discharge, Nasal Obstruction, Nasal Trauma, Nose Pain, Post Nasal Drip, Sinus 

Pain, Sinus Pressure, Bleeding Gums, Change in Voice, Dental Pain, Dry Mouth, 

Dysphagia, Halitosis, Hoarsness, Lip Swelling, Mouth Lesions, Mouth Pain, 

Odynophagia, Sore Throat, Throat Swelling, Tongue Swelling, Facial Pain, Neck 

Pain, Neck Mass, Other





- Cardiovascular


Cardiovascular: absent: As Per HPI, Acrocyanosis, Chest Pain, Chest Pain at Rest

, Chest Pain with Activity, Claudication, Diaphoresis, Dyspnea, Dyspnea on 

Exertion, Edema, Irregular Heart Rhythm, Pain Radiating to Arm/Neck/Jaw, Leg 

Edema, Leg Ulcers, Lightheadedness, Orthopnea, Palpitations, Paroxysmal 

Nocturnal Dyspnea, Pedal Edema, Radiating Pain, Rapid Heart Rate, Slow Heart 

Rate, Syncope, Other





- Respiratory


Respiratory: absent: As Per HPI, Cough, Dyspnea, Hemoptysis, Dyspnea on Exertion

, Wheezing, Snoring, Stridor, Pain on Inspiration, Chest Congestion, Excessive 

Mucous Production, Change in Mucous Color, Pain with Coughing, Other





- Gastrointestinal


Gastrointestinal: absent: As Per HPI, Abdominal Pain, Belching, Bloating, 

Change in Bowel Habits, Change in Stool Character, Coffee Ground Emesis, 

Constipation, Cramping, Diarrhea, Dyspepsia, Dysphagia, Early Satiety, 

Excessive Flatus, Fecal Incontinence, Heartburn, Hematemesis, Hematochezia, 

Loose Stools, Melena, Nausea, Odynophagia, Temesmus, Vomiting, Other





- Musculoskeletal


Musculoskeletal: absent: As Per HPI, Abnormal Gait, Arthralgias, Atrophy, Back 

Pain, Deformity, Joint Swelling, Limited Range of Motion, Loss of Height, 

Muscle Cramps, Muscle Weakness, Myalgias, Neck Pain, Numbness, Radiating Pain 

into Limb, Stiffness, Tingling, Other





- Integumentary


Integumentary: absent: As Per HPI, Acne, Alopecia, Bleeding Lesions, Change in 

Hair, Change in Nails, Change in Pigmentation, Changing Lesions, Dry Skin, 

Erythema, Furuncle, Hirsutism, Lesions, New Lesions, Non-Healing Lesions, 

Photosensitivity, Pruritus, Rash, Skin Pain, Skin Ulcer, Sores, Striae, Swelling

, Unusual Bruising, Wounds, Jaundice, Other





- Neurological


Neurological: Disequilibrium, Dizziness, Lack of Coordination





- Psychiatric


Psychiatric: absent: As Per HPI, Abnormal Sleep Pattern, Anhedonia, Anxiety, 

Auditory Hallucinations, Behavioral Changes, Change in Appetite, Change in 

Libido, Confusion, Depression, Difficulty Concentrating, Hallucinations, 

Homicidal Ideation, Hopelessness, Irritability, Memory Loss, Mood Swings, Panic 

Attacks, Paranoia, Suicidal Ideation, Visual Hallucinations, Tactile 

Hallucinations, Other





- Endocrine


Endocrine: absent: As Per HPI, Change in Body Appearance, Change in Libido, 

Cold Intolorance, Deepening of Voice, Excessive Sweating, Fatigue, Flushing, 

Heat Intolorance, Increase in Ring/Shoe/Hat Size, Palpitations, Polydipsia, 

Polyphagia, Polyuria, Other





- Hematologic/Lymphatic


Hematologic: absent: As Per HPI, Easy Bleeding, Easy Bruising, Lymphadenopathy, 

Other





Past Patient History





- Past Social History


Smoking Status: Never Smoked





- CARDIAC


Hx Hypertension: Yes





- GASTROINTESTINAL


Hx Ulcer: Yes





- PSYCHIATRIC


Hx Substance Use: No





- SURGICAL HISTORY


Other/Comment: "ULCER SURGERY"





- ANESTHESIA


Hx Anesthesia: Yes


Hx Anesthesia Reactions: No





Meds


Allergies/Adverse Reactions: 


 Allergies











Allergy/AdvReac Type Severity Reaction Status Date / Time


 


ibuprofen [From Advil] Allergy   Verified 07/18/18 11:49














- Medications


Medications: 


 Current Medications





Aspirin (Ecotrin)  [facility] mg PO DAILY [facility]


Heparin Sodium (Porcine) (Heparin)  5,000 units SC Q12 [facility]


   Last Admin: 07/18/18 22:31 Dose:  5,000 units


Lactated Ringer's (Lactated Ringer's)  1,000 mls @ 100 mls/hr IV .Q10H [facility]


   Last Admin: 07/19/18 06:15 Dose:  Not Given


Pantoprazole Sodium (Protonix Ec Tab)  40 mg PO DAILY [facility]


Rosuvastatin Calcium (Crestor)  20 mg PO HS [facility]


   Last Admin: 07/18/18 22:31 Dose:  20 mg











Physical Exam





- Constitutional


Appears: Non-toxic





- Head Exam


Head Exam: NORMAL INSPECTION





- Eye Exam


Eye Exam: Normal appearance





- ENT Exam


ENT Exam: Mucous Membranes Moist





- Neck Exam


Neck exam: Positive for: Full Rom





- Respiratory Exam


Respiratory Exam: NORMAL BREATHING PATTERN





- Cardiovascular Exam


Cardiovascular Exam: REGULAR RHYTHM





- GI/Abdominal Exam


GI & Abdominal Exam: Normal Bowel Sounds





- Rectal Exam


Rectal Exam: Deferred





- Extremities Exam


Extremities exam: Positive for: pedal edema





- Back Exam


Back exam: NORMAL INSPECTION





- Neurological Exam


Neurological exam: Alert, Oriented x3





- Psychiatric Exam


Psychiatric exam: Normal Affect





- Skin


Skin Exam: Normal Color





Results





- Vital Signs


Recent Vital Signs: 


 Last Vital Signs











Temp  97.7 F   07/19/18 07:25


 


Pulse  50 L  07/19/18 07:45


 


Resp  20   07/19/18 07:25


 


BP  156/73 H  07/19/18 07:25


 


Pulse Ox  98   07/19/18 08:00














- Labs


Result Diagrams: 


 07/19/18 06:57





 07/19/18 06:57


Labs: 


 Laboratory Results - last 24 hr











  07/18/18 07/18/18 07/18/18





  12:16 12:16 12:16


 


WBC  6.4  


 


RBC  4.96  


 


Hgb  16.7  


 


Hct  47.9  


 


MCV  96.6 H  


 


MCH  33.6 H  


 


MCHC  34.8  


 


RDW  12.5  


 


Plt Count  306  


 


MPV  7.8  


 


Neut % (Auto)  36.7 L  


 


Lymph % (Auto)  49.2 H  


 


Mono % (Auto)  12.3 H  


 


Eos % (Auto)  1.1  


 


Baso % (Auto)  0.7  


 


Neut # (Auto)  2.3  


 


Lymph # (Auto)  3.1  


 


Mono # (Auto)  0.8  


 


Eos # (Auto)  0.1  


 


Baso # (Auto)  0.0  


 


D-Dimer, Quantitative   < 200 


 


Sodium    142


 


Potassium    3.8


 


Chloride    101


 


Carbon Dioxide    30


 


Anion Gap    15


 


BUN    6 L


 


Creatinine    0.7 L


 


Est GFR ( Amer)    > 60


 


Est GFR (Non-Af Amer)    > 60


 


Random Glucose    129 H


 


Calcium    9.7


 


Phosphorus   


 


Magnesium   


 


Total Bilirubin    0.3


 


AST    44


 


ALT    37


 


Alkaline Phosphatase    70


 


Troponin I    < 0.0120


 


NT-Pro-B Natriuret Pep    63.7


 


Total Protein    7.7


 


Albumin    4.6


 


Globulin    3.1


 


Albumin/Globulin Ratio    1.5


 


Triglycerides   


 


Cholesterol   


 


LDL Cholesterol Direct   


 


HDL Cholesterol   


 


TSH 3rd Generation   


 


Urine Color   


 


Urine Clarity   


 


Urine pH   


 


Ur Specific Gravity   


 


Urine Protein   


 


Urine Glucose (UA)   


 


Urine Ketones   


 


Urine Blood   


 


Urine Nitrate   


 


Urine Bilirubin   


 


Urine Urobilinogen   


 


Ur Leukocyte Esterase   


 


Urine WBC (Auto)   


 


Urine RBC (Auto)   














  07/18/18 07/18/18 07/18/18





  12:16 12:34 18:46


 


WBC   


 


RBC   


 


Hgb   


 


Hct   


 


MCV   


 


MCH   


 


MCHC   


 


RDW   


 


Plt Count   


 


MPV   


 


Neut % (Auto)   


 


Lymph % (Auto)   


 


Mono % (Auto)   


 


Eos % (Auto)   


 


Baso % (Auto)   


 


Neut # (Auto)   


 


Lymph # (Auto)   


 


Mono # (Auto)   


 


Eos # (Auto)   


 


Baso # (Auto)   


 


D-Dimer, Quantitative   


 


Sodium   


 


Potassium   


 


Chloride   


 


Carbon Dioxide   


 


Anion Gap   


 


BUN   


 


Creatinine   


 


Est GFR ( Amer)   


 


Est GFR (Non-Af Amer)   


 


Random Glucose   


 


Calcium   


 


Phosphorus  3.6  


 


Magnesium  2.0  


 


Total Bilirubin   


 


AST   


 


ALT   


 


Alkaline Phosphatase   


 


Troponin I    < 0.0120


 


NT-Pro-B Natriuret Pep   


 


Total Protein   


 


Albumin   


 


Globulin   


 


Albumin/Globulin Ratio   


 


Triglycerides  138  


 


Cholesterol  224 H  


 


LDL Cholesterol Direct  138 H  


 


HDL Cholesterol  43  


 


TSH 3rd Generation   


 


Urine Color   Straw 


 


Urine Clarity   Clear 


 


Urine pH   6.0 


 


Ur Specific Gravity   1.004 


 


Urine Protein   Negative 


 


Urine Glucose (UA)   Normal 


 


Urine Ketones   Negative 


 


Urine Blood   Negative 


 


Urine Nitrate   Negative 


 


Urine Bilirubin   Negative 


 


Urine Urobilinogen   Normal 


 


Ur Leukocyte Esterase   Neg 


 


Urine WBC (Auto)   < 1 


 


Urine RBC (Auto)   1 














  07/19/18 07/19/18





  06:57 06:57


 


WBC  6.4 


 


RBC  4.76 


 


Hgb  15.8 


 


Hct  45.5 


 


MCV  95.6 H 


 


MCH  33.1 H 


 


MCHC  34.7 


 


RDW  12.6 


 


Plt Count  291 


 


MPV  7.7 


 


Neut % (Auto)  44.4 L 


 


Lymph % (Auto)  43.5 H 


 


Mono % (Auto)  9.5 


 


Eos % (Auto)  1.6 


 


Baso % (Auto)  1.0 


 


Neut # (Auto)  2.8 


 


Lymph # (Auto)  2.8 


 


Mono # (Auto)  0.6 


 


Eos # (Auto)  0.1 


 


Baso # (Auto)  0.1 


 


D-Dimer, Quantitative  


 


Sodium   141


 


Potassium   4.3


 


Chloride   100


 


Carbon Dioxide   33 H


 


Anion Gap   12


 


BUN   9


 


Creatinine   0.8


 


Est GFR ( Amer)   > 60


 


Est GFR (Non-Af Amer)   > 60


 


Random Glucose   108


 


Calcium   9.3


 


Phosphorus  


 


Magnesium  


 


Total Bilirubin   0.4


 


AST   34


 


ALT   35


 


Alkaline Phosphatase   60


 


Troponin I  


 


NT-Pro-B Natriuret Pep  


 


Total Protein   6.5


 


Albumin   3.6


 


Globulin   2.8


 


Albumin/Globulin Ratio   1.3


 


Triglycerides  


 


Cholesterol  


 


LDL Cholesterol Direct  


 


HDL Cholesterol  


 


TSH 3rd Generation   1.36


 


Urine Color  


 


Urine Clarity  


 


Urine pH  


 


Ur Specific Gravity  


 


Urine Protein  


 


Urine Glucose (UA)  


 


Urine Ketones  


 


Urine Blood  


 


Urine Nitrate  


 


Urine Bilirubin  


 


Urine Urobilinogen  


 


Ur Leukocyte Esterase  


 


Urine WBC (Auto)  


 


Urine RBC (Auto)  














- EKG Data


EKG Interpreted by: Myself


EKG shows normal: Sinus rhythm





Assessment & Plan


(1) Cerebellar stroke


Assessment and Plan: 


await echo.  ariel cardioembolic.  will discuss with neuro the role of 

anticoagulation.


Status: Acute   Priority: High   





(2) Hypertension


Assessment and Plan: 


will adjust blood pressure therapy


Status: Acute

## 2018-07-19 NOTE — CP.PCM.PN
Subjective





- Date & Time of Evaluation


Date of Evaluation: 07/19/18


Time of Evaluation: 17:00





- Subjective


Subjective: 


PGY-1 note for Dr Escobar service 





Patient is seen and examined at bedside. Patient was coming from the bathroom 

at time of encounter. Patient says he feels much better from time he came to 

ED. Patient says he has not felt dizzy or lightheaded anymore since coming at 

the hospital. Patient patient denies any pain. Pt denies fever, chills, weakness

, visual changes, slurred speech, facial drooping, headaches, hearing changes, 

shortness of breath, chest pain, nausea, vomiting, diarrhea or constipation. 








Objective





- Vital Signs/Intake and Output


Vital Signs (last 24 hours): 


 











Temp Pulse Resp BP Pulse Ox


 


 97.6 F   58 L  20   167/91 H  96 


 


 07/19/18 15:09  07/19/18 15:09  07/19/18 15:09  07/19/18 15:09  07/19/18 15:50








Intake and Output: 


 











 07/19/18 07/19/18





 06:59 18:59


 


Intake Total 1810 


 


Balance 1810 














- Medications


Medications: 


 Current Medications





Aspirin (Ecotrin)  81 mg PO DAILY [facility]


   Last Admin: 07/19/18 09:26 Dose:  81 mg


Heparin Sodium (Porcine) (Heparin)  5,000 units SC Q12 [facility]


   Last Admin: 07/19/18 09:26 Dose:  5,000 units


Pantoprazole Sodium (Protonix Ec Tab)  40 mg PO DAILY [facility]


   Last Admin: 07/19/18 09:26 Dose:  40 mg


Rosuvastatin Calcium (Crestor)  20 mg PO HS [facility]


   Last Admin: 07/18/18 22:31 Dose:  20 mg











- Labs


Labs: 


 





 07/19/18 06:57 





 07/19/18 06:57 











- Constitutional


Appears: Well, Non-toxic, No Acute Distress





- Head Exam


Head Exam: ATRAUMATIC, NORMAL INSPECTION, NORMOCEPHALIC





- Eye Exam


Eye Exam: EOMI, Normal appearance


Pupil Exam: NORMAL ACCOMODATION





- ENT Exam


ENT Exam: Mucous Membranes Moist, Normal Exam





- Neck Exam


Neck Exam: Full ROM, Normal Inspection.  absent: Lymphadenopathy, Tenderness, 

Thyromegaly





- Respiratory Exam


Respiratory Exam: Clear to Ausculation Bilateral, NORMAL BREATHING PATTERN.  

absent: Rales, Rhonchi, Wheezes





- Cardiovascular Exam


Cardiovascular Exam: REGULAR RHYTHM, +S1, +S2





- GI/Abdominal Exam


GI & Abdominal Exam: Soft, Normal Bowel Sounds.  absent: Distended, Firm, 

Guarding, Tenderness, Mass





- Extremities Exam


Extremities Exam: Full ROM, Normal Inspection.  absent: Calf Tenderness, Joint 

Swelling, Pedal Edema, Tenderness





- Back Exam


Back Exam: Full ROM, NORMAL INSPECTION





- Neurological Exam


Neurological Exam: Alert, Awake, CN II-XII Intact, Normal Gait, Oriented x3





- Psychiatric Exam


Psychiatric exam: Normal Affect, Normal Mood





- Skin


Skin Exam: Dry, Intact





Assessment and Plan





- Assessment and Plan (Free Text)


Plan: 





1.Syncope/Dizziness





R/O cardioembolic, or vessel-to-vessel emboli


07/18 CT scan: No definite acute intracranial findings as discussed above. F/U 

CT or MRI


07/18 MRI w/o contrast: Findings are consistent with a tiny acute/subacute 

infarct seen scattered throughout both inferior cerebellar hemispoheres and 

suspected minor possibly faint acute/subacute ischemic changes scattered about 

cortical/subcortical region of the occipito parietal watershed zones 

bilaterally (possible reversible)


07/18 CTA head and neck: No evidence of endoluminal thrombus, definite 

significant stenosis or occlusion in the intracranial arteries. No evidence of 

hemodynamically significant stenosis in the internal carotid arteries. Patent 

codominant bilateral vertebrae arteries. 


07/18 Chest Xray: No active disease 


07/19 Echo ordered -F/U official, final report 


 


07/19 Physical therapy eval and treat- pt presents w/ good functional mobility; 

was able to ambulate w/o LOB and do stairs w/o any difficulty; weight shifting -

side/side, front-back, able to do single leg stance but a a little wobbly; cont 

pT to improve neuro-muscular facilitation.


Ortho eval Lying - 153/86, Sitting - 166/97, Standing - 171/93





-Dr Hsieh consulted - F/U echo likely cardioembolic, to discuss the role of 

anticoagulation. to adjust blood pressure therapy. 


-Dr Sapp consulted - Telemetry, CTA head and neck, echocardiogram with bubble 

study, aspirin, crestor, fluids NS @ 100ml.hr, PT/OT eval, only treat with BP 

higher 220/110 for next 24 hours, DVT ppx. 


- Continue monitoring


Continue Aspirin, Rosuvastatin 20mg PO


F/U am labs, CBC, CMP


-continue neuro check 








2. Uncontrolled Hypertension





-asymptomatic 


-today: 156/73 --> 167/91


-Permissive hypertension (only treat BP that is higher than 220/110 mm Hg for 

the next 24 hours)


- continue Home medication [id number] PO Daily.


- continue Hydrochlorothiazide 12.5mg PO Daily


-F/U Dr Jori novak


-Will continue to monitor vitals.





PPX


-Protonix 40mg PO daily 


-Heparin 5000 Sc Q12


-Heart healthy diet 





Plan discussed with Dr. Shawn Coyle PGY-1

## 2018-07-19 NOTE — PCM.STROKE
Interval History


Critical Care Time Spent (in minutes): 15


Stroke Date: 07/18/18





- Treatment


DVT Prophylaxis: Heparin (5000 units SC Q 12)


Antiplatelet: Acetylsalicylic acid (ASA) (81 mg PO daily)


Statin: Rosuvastatin (20 mg PO HS)





- Education


Written Stroke Education provided regarding: personal risk factors, stroke 

warning sign/symptoms, how to activate emergency medical services, need to 

follow up after discharge (post discharge)





NIHSS Stroke Scale





- Date/Time Evaluation Performed


Date Performed: 07/18/18


Time Performed: 17:20


When Was NIHSS Performed: 24 hours post onset S/S





- How Severe is the Stroke


Level of Consciousness: 0=Alert


LOC to Questions: 0=Both comments correct


LOC to commands: 0=Obeys both correctly


Best Gaze: 0=Normal


Visual: 0=No visual loss


Facial: 1=Minor asymmetry


Motor Arm - Left: 0=No drift


Motor Arm - Right: 0=No drift


Motor Leg - Left: 0=No drift


Motor Leg - Right: 0=No drift


Limb Ataxia: 0=Absent


Sensory: 0=Normal


Best Language: 0=No aphasia


Dysarthia: 0=Normal articulation


Extinction & Inattention (Neglect): 0=Normal, no object


Score: 1





Exam





- Vital Sign


Vital Signs: 


 











Temp Pulse Resp BP Pulse Ox


 


 97.7 F   50 L  20   156/73 H  98 


 


 07/19/18 07:25  07/19/18 07:45  07/19/18 07:25  07/19/18 07:25  07/19/18 08:00








Constitutional: Normal appearing


Right Pupil: Reactive


Right Pupil Size (in mm): 2


Left Pupil: Reactive


Left Pupil Size (in mm): 2


Cardiovascular: Regular rate & rhythm


Mental Status: Normal: Orientation


Cranial Nerve: Normal: Visual Fields, Extraocular movement intact, Facial 

Sensation, Facial Strength (normal), Hearing, Palate/Tongue Movement, Shoulder 

Strength


Motor: Tone


Neuro motor strength exam: Left Upper Extremity: 5, Right Upper Extremity: 5, 

Left Lower Extremity: 5, Right Lower Extremity: 5


Sensation: Intact to pin


Flexor Plantar Reflex: Normal


Coordination: Finger/nose


Vascular Risk: Hypertension





- Data reviewed


Laboratory results: 


 





 07/19/18 06:57 





 





 07/19/18 06:57 





 











Triglycerides  138 mg/dL (0-149)   07/18/18  12:16    


 


Cholesterol  224 mg/dL (0-199)  H  07/18/18  12:16    


 


LDL Cholesterol Direct  138 mg/dL (0-129)  H  07/18/18  12:16    


 


HDL Cholesterol  43 mg/dL (30-70)   07/18/18  12:16    














Assessment and Plan


(1) Cerebellar stroke


Assessment & Plan: 


CAse discussed with Dr. Sapp, continue all current medical,physical, 

occupational, and speech therapies. Pending echocardiogram. Recommend 

permissive hypertension until 6 pm today, keep head of bed elevated at least 30 

degrees, treat any electrolyte abnormalities.


Status: Acute

## 2018-07-19 NOTE — CARD
--------------- APPROVED REPORT --------------





Date of service: 07/18/2018



EKG Measurement

Heart [id number]

NE [id number]

[id number]

[id number]

[id number]



<Conclusion>

Sinus bradycardia

Possible Left atrial enlargement

Left ventricular hypertrophy

Nonspecific ST and T wave abnormality

Abnormal ECG N/A

## 2018-07-20 VITALS — HEART RATE: 85 BPM | SYSTOLIC BLOOD PRESSURE: 170 MMHG | DIASTOLIC BLOOD PRESSURE: 90 MMHG | TEMPERATURE: 98.8 F

## 2018-07-20 VITALS — OXYGEN SATURATION: 98 %

## 2018-07-20 LAB
ALBUMIN SERPL-MCNC: 3.9 G/DL (ref 3.5–5)
ALBUMIN/GLOB SERPL: 1.4 {RATIO} (ref 1–2.1)
ALT SERPL-CCNC: 34 U/L (ref 21–72)
AST SERPL-CCNC: 29 U/L (ref 17–59)
BASOPHILS # BLD AUTO: 0.1 K/UL (ref 0–0.2)
BASOPHILS NFR BLD: 0.8 % (ref 0–2)
BUN SERPL-MCNC: 10 MG/DL (ref 9–20)
CALCIUM SERPL-MCNC: 9.2 MG/DL (ref 8.6–10.4)
EOSINOPHIL # BLD AUTO: 0.1 K/UL (ref 0–0.7)
EOSINOPHIL NFR BLD: 1.3 % (ref 0–4)
ERYTHROCYTE [DISTWIDTH] IN BLOOD BY AUTOMATED COUNT: 12.6 % (ref 11.5–14.5)
GFR NON-AFRICAN AMERICAN: > 60
HGB BLD-MCNC: 16.2 G/DL (ref 12–18)
LYMPHOCYTES # BLD AUTO: 3.1 K/UL (ref 1–4.3)
LYMPHOCYTES NFR BLD AUTO: 44.1 % (ref 20–40)
MCH RBC QN AUTO: 33.2 PG (ref 27–31)
MCHC RBC AUTO-ENTMCNC: 34.9 G/DL (ref 33–37)
MCV RBC AUTO: 95.2 FL (ref 80–94)
MONOCYTES # BLD: 0.6 K/UL (ref 0–0.8)
MONOCYTES NFR BLD: 9 % (ref 0–10)
NEUTROPHILS # BLD: 3.1 K/UL (ref 1.8–7)
NEUTROPHILS NFR BLD AUTO: 44.8 % (ref 50–75)
NRBC BLD AUTO-RTO: 0.2 % (ref 0–2)
PLATELET # BLD: 300 K/UL (ref 130–400)
PMV BLD AUTO: 7.8 FL (ref 7.2–11.7)
RBC # BLD AUTO: 4.88 MIL/UL (ref 4.4–5.9)
WBC # BLD AUTO: 7 K/UL (ref 4.8–10.8)

## 2018-07-20 RX ADMIN — PANTOPRAZOLE SODIUM SCH MG: 40 TABLET, DELAYED RELEASE ORAL at 10:24

## 2018-07-20 NOTE — CP.PCM.PN
Subjective





- Date & Time of Evaluation


Date of Evaluation: 07/20/18


Time of Evaluation: 08:00





- Subjective


Subjective: 





patient has no new dizziness.





Objective





- Vital Signs/Intake and Output


Vital Signs (last 24 hours): 


 











Temp Pulse Resp BP Pulse Ox


 


 98 F   59 L  20   137/69   96 


 


 07/20/18 04:20  07/20/18 07:04  07/20/18 04:20  07/20/18 04:20  07/20/18 02:49








Intake and Output: 


 











 07/20/18 07/20/18





 06:59 18:59


 


Intake Total 500 


 


Balance 500 














- Medications


Medications: 


 Current Medications





Amlodipine Besylate (Norvasc)  10 mg PO DAILY [facility]


Aspirin (Ecotrin)  81 mg PO DAILY [facility]


   Last Admin: 07/19/18 09:26 Dose:  81 mg


Heparin Sodium (Porcine) (Heparin)  5,000 units SC Q12 [facility]


   Last Admin: 07/19/18 21:24 Dose:  5,000 units


Hydrochlorothiazide (Microzide)  12.5 mg PO DAILY [facility]


Pantoprazole Sodium (Protonix Ec Tab)  40 mg PO DAILY [facility]


   Last Admin: 07/19/18 09:26 Dose:  40 mg


Rosuvastatin Calcium (Crestor)  20 mg PO HS [facility]


   Last Admin: 07/19/18 21:24 Dose:  20 mg











- Labs


Labs: 


 





 07/20/18 06:21 





 07/20/18 06:21 











- Constitutional


Appears: Non-toxic





- Head Exam


Head Exam: NORMAL INSPECTION





- Eye Exam


Eye Exam: Normal appearance





- ENT Exam


ENT Exam: Mucous Membranes Moist





- Neck Exam


Neck Exam: Full ROM





- Respiratory Exam


Respiratory Exam: NORMAL BREATHING PATTERN





- Cardiovascular Exam


Cardiovascular Exam: REGULAR RHYTHM





- GI/Abdominal Exam


GI & Abdominal Exam: Normal Bowel Sounds





- Rectal Exam


Rectal Exam: Deferred





- Extremities Exam


Extremities Exam: Pedal Edema





- Back Exam


Back Exam: NORMAL INSPECTION





- Neurological Exam


Neurological Exam: Alert





- Psychiatric Exam


Psychiatric exam: Normal Affect





- Skin


Skin Exam: Normal Color





Assessment and Plan


(1) Cerebellar stroke


Assessment & Plan: 


discussed with neuro.  will conitnue ASA/Plavix.  statin therapy.


Status: Acute   





(2) Hypertension


Assessment & Plan: 


add Losartan 25 mg daily.  outpatient follow up


Status: Acute

## 2018-07-20 NOTE — CP.PCM.PN
Subjective





- Date & Time of Evaluation


Date of Evaluation: 07/20/18


Time of Evaluation: 08:00





- Subjective


Subjective: 





Mr. Rogel was seen and examined at the bedside. He is alert, oriented x 3. He 

denies any headache, dizziness, lightheadedness, nausea, or vomiting. He is 

able to perform his ADL independently, ambulates with steady gait. 

Echocardiogram showed no thrombus no PFO. There was no untoward events 

overnight.





Objective





- Vital Signs/Intake and Output


Vital Signs (last 24 hours): 


 











Temp Pulse Resp BP Pulse Ox


 


 98 F   59 L  20   137/69   96 


 


 07/20/18 04:20  07/20/18 07:04  07/20/18 04:20  07/20/18 04:20  07/20/18 02:49








Intake and Output: 


 











 07/20/18 07/20/18





 06:59 18:59


 


Intake Total 500 


 


Balance 500 














- Medications


Medications: 


 Current Medications





Amlodipine Besylate (Norvasc)  10 mg PO DAILY [facility]


Aspirin (Ecotrin)  81 mg PO DAILY [facility]


   Last Admin: 07/19/18 09:26 Dose:  81 mg


Heparin Sodium (Porcine) (Heparin)  5,000 units SC Q12 [facility]


   Last Admin: 07/19/18 21:24 Dose:  5,000 units


Hydrochlorothiazide (Microzide)  12.5 mg PO DAILY [facility]


Pantoprazole Sodium (Protonix Ec Tab)  40 mg PO DAILY [facility]


   Last Admin: 07/19/18 09:26 Dose:  40 mg


Rosuvastatin Calcium (Crestor)  20 mg PO HS [facility]


   Last Admin: 07/19/18 21:24 Dose:  20 mg











- Labs


Labs: 


 





 07/20/18 06:21 





 07/20/18 06:21 











- Constitutional


Appears: No Acute Distress





- Head Exam


Head Exam: NORMAL INSPECTION





- Eye Exam


Pupil Exam: PERRL





- Neurological Exam


Neurological Exam: Alert, Awake


Neuro motor strength exam: Left Upper Extremity: 5, Right Upper Extremity: 5, 

Left Lower Extremity: 5, Right Lower Extremity: 5


Additional comments: 





no neuro deficits





Assessment and Plan


(1) Cerebellar stroke


Assessment & Plan: 


Case discussed with Dr. Sapp, continue all current medical regimen. Recommend 

blood pressure control, hydration, follow up with an outpatient neurologist ( 

provided the patient with Dr. Sapp's info), cardiologist ( Dr. Hsieh).


Status: Acute

## 2018-07-20 NOTE — CARD
--------------- APPROVED REPORT --------------





Date of service: 07/19/2018



EXAM: Two-dimensional and M-mode echocardiogram with Doppler and 

color Doppler.



INDICATION

CVA/TIA Dizziness and Vertigo 



RISK FACTORS

Hypertension 



2D DIMENSIONS 

IVSd1.2   (0.7-1.1cm)Aortic Root (2D)2.9   (2.0-3.7cm)

LVDd5.3   (3.9-5.9cm)PWd0.8   (0.7-1.1cm)

LVDs3.1   (2.5-4.0cm)FS (%) 41.5   %

LVEF (%)72.0   (>50%)



M-Mode DIMENSIONS 

RVDd1.42   (2.1-3.2cm)Left Atrium (MM)5.09   (2.5-4.0cm)

IVSd1.15   (0.7-1.1cm)Aortic Root3.06   (2.2-3.7cm)

LVDd6.18   (4.0-5.6cm)Aortic Cusp Exc.2.30   (1.5-2.0cm)

PWd0.76   (0.7-1.1cm)FS (%) 54   %

LVDs2.85   (2.0-3.8cm)LVEF (%)74   (>50%)



Aortic Valve

AI P 1/2 [id number]



Mitral Valve

MV E Mfaeohwz87.0cm/sMV A Irdwduam71.4cm/sE/A ratio2.8



TDI

E/Lateral E'0.0E/Medial E'0.0



Tricuspid Valve

TR Peak [id number]/sTR Peak [id number]



 LEFT VENTRICLE 

The left ventricle is normal size.

Proximal septal thickening is noted.

The echo findings are not consistent with left ventricular outflow 

obstruction.

Left ventricle systolic function is normal.

The Ejection Fraction is >70%.

There is normal LV segmental wall motion.

The left ventricular diastolic function is normal.



 RIGHT VENTRICLE 

The right ventricle is normal size.

There is normal right ventricular wall thickness.

The right ventricular systolic function is normal.



 ATRIA 

The left atrium size is normal.

The right atrium size is normal.

The interatrial septum is intact with no evidence for an atrial 

septal defect.



 AORTIC VALVE 

The aortic valve is normal in structure.

There is mild aortic regurgitation. 

There is no aortic valvular stenosis. 

There is no aortic valvular vegetation.



 MITRAL VALVE 

The mitral valve is normal in structure.

There is no evidence of mitral valve prolapse.

There is no mitral valve stenosis.

Mitral regurgitation is mild.



 TRICUSPID VALVE 

The tricuspid valve is normal in structure.

There is mild tricuspid regurgitation.

Right ventricular systolic pressure is estimated at 30-40 mmHg. 

There is mild pulmonary hypertension.



 PULMONIC VALVE 

The pulmonic valve is not well visualized.

There is mild pulmonic valvular regurgitation. 



 GREAT VESSELS 

The aortic root is normal in size.



 PERICARDIAL EFFUSION 

There is no significant pericardial effusion.



<Conclusion>

Left ventricle systolic function is normal.

The Ejection Fraction is >70%.

Proximal septal thickening is noted.

The echo findings are not consistent with left ventricular outflow 

obstruction.

There is mild aortic regurgitation.

Mitral regurgitation is mild.

There is mild tricuspid regurgitation.

There is mild pulmonary hypertension.

There is mild pulmonic valvular regurgitation.

## 2018-07-20 NOTE — CP.PCM.DIS
Provider





- Provider


Date of Admission: 


07/18/18 14:09





Attending physician: 


Royal Escobar MD





Time Spent in preparation of Discharge (in minutes): 60





Hospital Course





- Lab Results


Lab Results: 


 Most Recent Lab Values











WBC  7.0 K/uL (4.8-10.8)   07/20/18  06:21    


 


RBC  4.88 Mil/uL (4.40-5.90)   07/20/18  06:21    


 


Hgb  16.2 g/dL (12.0-18.0)   07/20/18  06:21    


 


Hct  46.5 % (35.0-51.0)   07/20/18  06:21    


 


MCV  95.2 fL (80.0-94.0)  H  07/20/18  06:21    


 


MCH  33.2 pg (27.0-31.0)  H  07/20/18  06:21    


 


MCHC  34.9 g/dL (33.0-37.0)   07/20/18  06:21    


 


RDW  12.6 % (11.5-14.5)   07/20/18  06:21    


 


Plt Count  300 K/uL (130-400)   07/20/18  06:21    


 


MPV  7.8 fL (7.2-11.7)   07/20/18  06:21    


 


Neut % (Auto)  44.8 % (50.0-75.0)  L  07/20/18  06:21    


 


Lymph % (Auto)  44.1 % (20.0-40.0)  H  07/20/18  06:21    


 


Mono % (Auto)  9.0 % (0.0-10.0)   07/20/18  06:21    


 


Eos % (Auto)  1.3 % (0.0-4.0)   07/20/18  06:21    


 


Baso % (Auto)  0.8 % (0.0-2.0)   07/20/18  06:21    


 


Neut # (Auto)  3.1 K/uL (1.8-7.0)   07/20/18  06:21    


 


Lymph # (Auto)  3.1 K/uL (1.0-4.3)   07/20/18  06:21    


 


Mono # (Auto)  0.6 K/uL (0.0-0.8)   07/20/18  06:21    


 


Eos # (Auto)  0.1 K/uL (0.0-0.7)   07/20/18  06:21    


 


Baso # (Auto)  0.1 K/uL (0.0-0.2)   07/20/18  06:21    


 


D-Dimer, Quantitative  < 200 ng/mlDDU (0-243)   07/18/18  12:16    


 


Sodium  139 mmol/L (132-148)   07/20/18  06:21    


 


Potassium  4.4 mmol/L (3.6-5.2)   07/20/18  06:21    


 


Chloride  100 mmol/L ()   07/20/18  06:21    


 


Carbon Dioxide  31 mmol/L (22-30)  H  07/20/18  06:21    


 


Anion Gap  12  (10-20)   07/20/18  06:21    


 


BUN  10 mg/dL (9-20)   07/20/18  06:21    


 


Creatinine  0.8 mg/dL (0.8-1.5)   07/20/18  06:21    


 


Est GFR ( Amer)  > 60   07/20/18  06:21    


 


Est GFR (Non-Af Amer)  > 60   07/20/18  06:21    


 


Random Glucose  117 mg/dL ()  H  07/20/18  06:21    


 


Calcium  9.2 mg/dl (8.6-10.4)   07/20/18  06:21    


 


Phosphorus  3.6 mg/dL (2.5-4.5)   07/18/18  12:16    


 


Magnesium  2.0 mg/dL (1.6-2.3)   07/18/18  12:16    


 


Total Bilirubin  0.3 mg/dL (0.2-1.3)   07/20/18  06:21    


 


AST  29 U/L (17-59)   07/20/18  06:21    


 


ALT  34 U/L (21-72)   07/20/18  06:21    


 


Alkaline Phosphatase  68 U/L ()   07/20/18  06:21    


 


Troponin I  < 0.0120 ng/mL (0.00-0.120)   07/18/18  18:46    


 


NT-Pro-B Natriuret Pep  63.7 pg/mL (0-900)   07/18/18  12:16    


 


Total Protein  6.7 g/dL (6.3-8.3)   07/20/18  06:21    


 


Albumin  3.9 g/dL (3.5-5.0)   07/20/18  06:21    


 


Globulin  2.8 gm/dL (2.2-3.9)   07/20/18  06:21    


 


Albumin/Globulin Ratio  1.4  (1.0-2.1)   07/20/18  06:21    


 


Triglycerides  138 mg/dL (0-149)   07/18/18  12:16    


 


Cholesterol  224 mg/dL (0-199)  H  07/18/18  12:16    


 


LDL Cholesterol Direct  138 mg/dL (0-129)  H  07/18/18  12:16    


 


HDL Cholesterol  43 mg/dL (30-70)   07/18/18  12:16    


 


TSH 3rd Generation  1.36 mIU/L (0.46-4.68)   07/19/18  06:57    


 


Urine Color  Straw  (YELLOW)   07/18/18  12:34    


 


Urine Clarity  Clear  (Clear)   07/18/18  12:34    


 


Urine pH  6.0  (5.0-8.0)   07/18/18  12:34    


 


Ur Specific Gravity  1.004  (1.003-1.030)   07/18/18  12:34    


 


Urine Protein  Negative mg/dL (NEGATIVE)   07/18/18  12:34    


 


Urine Glucose (UA)  Normal mg/dL (Normal)   07/18/18  12:34    


 


Urine Ketones  Negative mg/dL (NEGATIVE)   07/18/18  12:34    


 


Urine Blood  Negative  (NEGATIVE)   07/18/18  12:34    


 


Urine Nitrate  Negative  (NEGATIVE)   07/18/18  12:34    


 


Urine Bilirubin  Negative  (NEGATIVE)   07/18/18  12:34    


 


Urine Urobilinogen  Normal mg/dL (0.2-1.0)   07/18/18  12:34    


 


Ur Leukocyte Esterase  Neg Della/uL (Negative)   07/18/18  12:34    


 


Urine WBC (Auto)  < 1 /hpf (0-5)   07/18/18  12:34    


 


Urine RBC (Auto)  1 /hpf (0-3)   07/18/18  12:34    














- Hospital Course


Hospital Course: 





58 year old male with a past medical history of hypertension and duodenal ulcer 

(s/p abdominal surgery) who comes in today complaining of dizziness and nausea 

since Sunday evening.  The patient states he was at rest initially when the 

dizziness began.  He also reports sitting up exacerbating the symptoms.  He 

denies any alleviating or modifying factors in regards to the dizziness.  

Initially when he first had the dizziness he went to an Urgent care were they 

referred him to the [facility] for further workup after his blood 

pressure came back elevated.  After the workup at [facility] he was 

referred to the Cardiologist Dr Bates for further workup as an outpatient. 

Of note, the patient was at his Cardiologist Dr. Hsieh in the waiting room 

when he "blacked out" and can't recall the events leading up to this.  In 

conjunction he also reports a posterior headache.  He denies any chest pain, 

shortness of breath, orthopnea, dyspnea, fevers, chills, cough, changes in 

vision, or any other complaints.





Brain CT was inconclusive for ischmic infarction. MRI showed major vascular 

flow voids at skull base patent consistent with tiny acute/subacute infarcts 

scattered throughout inferior cerebellar hemispheres bilaterally, with ischemic 

changes in cortical and subcortical regions of the occipitoparietal watershed 

zones bilaterally. No acute intracranial hemorrhage was detected. In accordance 

with ischemic stroke protocol, antihypertensive medications were withheld for 

permissive hypertension. Administered heparin 5000u SC Q12, aspirin 81 POD, 

clopidogrel 75mg PO QD, Rosuvastatin 20mg PO HS. Prophylactic pantoprazole 40mg 

PO QD was also administered.


Echocardiography shows mild aortic, mitral, pulmonic, and tricuspid 

regurgitation, in addition to pulmonary hypertension. No thrombus was found in 

the heart and contrast showed no crossing through ventricular/atrial septae. 





Primary Diagnosis: Cerebellar Stroke 2/2 uncontrolled hypertension





Patient is cleared for discharge per Dr. Escobar. Patient should resume stop 

taking his amlodipine 5mg in favor of the 10mg dosing. To reiterate, please 

take the newly prescribed medications as prescribed:


Aspirin 81mg by mouth daily


Plavix 75mg by mouth daily


Losartan 25mg by mouth daily


Crestor 20mg by mouth daily


Amlodipine 10mg by mouth daily


Hydrochlorothiazide 12.5mg by mouth daily





Patient should follow up in two weeks with Dr. Sapp following this cerebellar 

stroke. He should also follow up with Dr. Hsieh to check his heart and 

hypertension. Lastly, he should go to his PMD and keep him appraised of the 

situation. If symptoms should worsen or return, patient should immediately 

return to the ED. This was all discussed with the patient who understood and 

agreed.





This is a summary of the hospital course. For more information please refer to 

the EMR.





- Date & Time of H&P


Date of H&P: 07/20/18


Time of H&P: 15:25





Discharge Exam





- Head Exam


Head Exam: NORMAL INSPECTION





- Eye Exam


Eye Exam: EOMI, Normal appearance, PERRL.  absent: Scleral icterus


Pupil Exam: NORMAL ACCOMODATION, PERRL





- ENT Exam


ENT Exam: Mucous Membranes Moist, Normal Exam





- Neck Exam


Neck exam: Normal Inspection





- Respiratory Exam


Respiratory Exam: Clear to PA & Lateral, NORMAL BREATHING PATTERN, 

UNREMARKABLE.  absent: Rales, Rhonchi, Wheezes





- Cardiovascular Exam


Cardiovascular Exam: REGULAR RHYTHM, +S1, +S2.  absent: Gallop, Rubs, Systolic 

Murmur





- GI/Abdominal Exam


GI & Abdominal Exam: Normal Bowel Sounds, Soft.  absent: Tenderness





- Extremities Exam


Extremities exam: full ROM


Additional comments: 





IV in left arm removed prior to discharge


peripheral pulses present bilaterally 3+ (radial, DP)





- Neurological Exam


Neurological exam: Alert, CN II-XII Intact, Normal Gait, Oriented x3, Reflexes 

Normal





- Psychiatric Exam


Psychiatric exam: Normal Affect, Normal Mood





- Skin


Skin Exam: Dry, Intact, Normal Color, Warm





Discharge Plan





- Discharge Medications


Prescriptions: 


amLODIPine [Norvasc] 10 mg PO DAILY #30 tab


Aspirin [Ecotrin] 81 mg PO DAILY #30 tabec


Clopidogrel [Plavix] 75 mg PO DAILY #30 tab


hydroCHLOROthiazide [Microzide] 12.5 mg PO DAILY #30 cap


Losartan [Cozaar] 25 mg PO DAILY #30 tab


Rosuvastatin Calcium [Crestor] 20 mg PO HS #30 tab





- Follow Up Plan


Condition: FAIR


Disposition: HOME/ ROUTINE


Instructions:  DASH Diet, Stroke (DC), Low Salt Diet, Dizziness, Nonvertigo, (DC

), Amlodipine, Aspirin, Clopidogrel, Hydrochlorothiazide, Losartan, Rosuvastatin

, Hypertension (DC)


Additional Instructions: 


Patient is cleared for discharge per Dr. Escobar. Patient should resume stop 

taking his amlodipine 5mg in favor of the 10mg dosing. To reiterate, please 

take the newly prescribed medications as prescribed:





Aspirin 81mg by mouth daily


Plavix 75mg by mouth daily


Losartan 25mg by mouth daily


Crestor 20mg by mouth daily


Amlodipine 10mg by mouth daily


Hydrochlorothiazide 12.5mg by mouth daily





Patient should follow up in two weeks with Dr. Sapp following this cerebellar 

stroke. He should also follow up with Dr. Hsieh to check his heart. Lastly, 

he should go to his PMD and keep him appraised of the situation. If symptoms 

should worsen or return, patient should immediately return to the ED. This was 

all discussed with the patient who understood and agreed.


Referrals: 


Valerio Sapp MD [Staff Provider] - 


Mercedes Hsieh MD [Staff Provider] - 





Clinical Quality Measures





- CQM - Stroke


Antithrombotic Prescribed: Yes


Anticoagulation Prescribed for Atrial Flutter, Atrial Fibrillation and History 

of:: Yes


Statin prescribed: Yes

## 2018-09-26 ENCOUNTER — HOSPITAL ENCOUNTER (EMERGENCY)
Dept: HOSPITAL 31 - C.ER | Age: 58
Discharge: HOME | End: 2018-09-26
Payer: COMMERCIAL

## 2018-09-26 VITALS
RESPIRATION RATE: 16 BRPM | DIASTOLIC BLOOD PRESSURE: 76 MMHG | SYSTOLIC BLOOD PRESSURE: 149 MMHG | HEART RATE: 64 BPM | OXYGEN SATURATION: 100 %

## 2018-09-26 VITALS — TEMPERATURE: 98.7 F

## 2018-09-26 DIAGNOSIS — R51: ICD-10-CM

## 2018-09-26 DIAGNOSIS — I10: Primary | ICD-10-CM

## 2018-09-26 DIAGNOSIS — Z86.73: ICD-10-CM

## 2018-09-26 DIAGNOSIS — E78.5: ICD-10-CM

## 2018-09-26 LAB
ALBUMIN SERPL-MCNC: 4.8 G/DL (ref 3.5–5)
ALBUMIN/GLOB SERPL: 1.4 {RATIO} (ref 1–2.1)
ALT SERPL-CCNC: 40 U/L (ref 21–72)
APTT BLD: 33 SECONDS (ref 21–34)
AST SERPL-CCNC: 35 U/L (ref 17–59)
BASOPHILS # BLD AUTO: 0.1 K/UL (ref 0–0.2)
BASOPHILS NFR BLD: 1 % (ref 0–2)
BILIRUB UR-MCNC: NEGATIVE MG/DL
BUN SERPL-MCNC: 9 MG/DL (ref 9–20)
CALCIUM SERPL-MCNC: 10.1 MG/DL (ref 8.6–10.4)
EOSINOPHIL # BLD AUTO: 0.1 K/UL (ref 0–0.7)
EOSINOPHIL NFR BLD: 1.1 % (ref 0–4)
ERYTHROCYTE [DISTWIDTH] IN BLOOD BY AUTOMATED COUNT: 12.7 % (ref 11.5–14.5)
GFR NON-AFRICAN AMERICAN: > 60
GLUCOSE UR STRIP-MCNC: NORMAL MG/DL
HGB BLD-MCNC: 15.6 G/DL (ref 12–18)
INR PPP: 1.1
LEUKOCYTE ESTERASE UR-ACNC: (no result) LEU/UL
LYMPHOCYTES # BLD AUTO: 2.4 K/UL (ref 1–4.3)
LYMPHOCYTES NFR BLD AUTO: 33.1 % (ref 20–40)
MCH RBC QN AUTO: 33.8 PG (ref 27–31)
MCHC RBC AUTO-ENTMCNC: 35.4 G/DL (ref 33–37)
MCV RBC AUTO: 95.3 FL (ref 80–94)
MONOCYTES # BLD: 1 K/UL (ref 0–0.8)
MONOCYTES NFR BLD: 13.9 % (ref 0–10)
NEUTROPHILS # BLD: 3.8 K/UL (ref 1.8–7)
NEUTROPHILS NFR BLD AUTO: 50.9 % (ref 50–75)
NRBC BLD AUTO-RTO: 0.2 % (ref 0–2)
PH UR STRIP: 7 [PH] (ref 5–8)
PLATELET # BLD: 284 K/UL (ref 130–400)
PMV BLD AUTO: 7.3 FL (ref 7.2–11.7)
PROT UR STRIP-MCNC: NEGATIVE MG/DL
PROTHROMBIN TIME: 11.5 SECONDS (ref 9.7–12.2)
RBC # BLD AUTO: 4.61 MIL/UL (ref 4.4–5.9)
RBC # UR STRIP: (no result) /UL
SP GR UR STRIP: 1 (ref 1–1.03)
UROBILINOGEN UR-MCNC: NORMAL MG/DL (ref 0.2–1)
WBC # BLD AUTO: 7.4 K/UL (ref 4.8–10.8)

## 2018-09-26 NOTE — C.PDOC
History Of Present Illness


58 year old male with PMHx of previous stroke, hyperlipedimia, and HTN presents 

to the ED complaining of headache. He reports he feels "drowsy" as well as 

chills. Patient states his blood pressure was elevated at home. He denies any 

fever or vision changes. pt denies any vertigo dizziness, and states does not 

feel similar to previous stroke in july


Time Seen by Provider: 18 19:04


Chief Complaint (Nursing): High Blood Pressure


History Per: Patient


History/Exam Limitations: no limitations


Onset/Duration Of Symptoms: Days


Current Symptoms Are (Timing): Still Present


Associated Symptoms: Headache





Past Medical History


Reviewed: Historical Data, Nursing Documentation, Vital Signs


Vital Signs: 





                                Last Vital Signs











Temp  98.7 F   18 18:52


 


Pulse  71   18 18:52


 


Resp  18   18 18:52


 


BP  174/88 H  18 18:52


 


Pulse Ox  99   18 18:52














- Medical History


PMH: HTN


Surgical History: No Surg Hx


Family History: States: No Known Family Hx





- Social History


Hx Alcohol Use: Yes (former)


Hx Substance Use: No





- Immunization History


Hx Tetanus Toxoid Vaccination: No


Hx Influenza Vaccination: No


Hx Pneumococcal Vaccination: No





Review Of Systems


Except As Marked, All Systems Reviewed And Found Negative.


Constitutional: Positive for: Chills.  Negative for: Fever


Neurological: Positive for: Headache





Physical Exam





- Physical Exam


Appears: Non-toxic, No Acute Distress


Skin: Warm, Dry


Head: Normacephalic


Eye(s): bilateral: Normal Inspection


Nose: Normal


Oral Mucosa: Moist


Neck: Normal ROM


Chest: Symmetrical


Cardiovascular: Rhythm Regular


Respiratory: Normal Breath Sounds, No Rales, No Rhonchi, No Wheezing


Gastrointestinal/Abdominal: Soft, No Tenderness


Extremity: Normal ROM


Neurological/Psych: Oriented x3, Normal Speech


Gait: Steady





ED Course And Treatment





- Laboratory Results


Result Diagrams: 


                                 18 19:36





                                 18 19:36


ECG: Interpreted By Me, Viewed By Me


ECG Rhythm: Sinus Rhythm


Rate From EC


O2 Sat by Pulse Oximetry: 99 (RA)


Pulse Ox Interpretation: Normal





Medical Decision Making


Medical Decision Making: 


ha/htn- ro intracranial, metabolic, infectious etiology


Orders: 


- CT Head 


- EKG 


-Labwork


- Bloodwork


- UA








pt reassesed b/p decreasing. denies any vertigo, neurologic symptoms like 

previous cva. states feels well for dc. 


EXAM: 


CT Head Without IV contrast. 





CLINICAL HISTORY: 


HEADACHE. HYPERTENSION. 





TECHNIQUE: 


Axial computed tomography images of the head/brain without intravenous contrast.

 mGy-cm 





COMPARISON: 


None provided. 





FINDINGS: 





BRAIN 


No acute intraparenchymal hemorrhage. No mass lesion. No CT evidence for acute 

territorial infarct. No midline shift or extra-axial collections. 





VENTRICLES: 


No hydrocephalus. 





ORBITS: 


The orbits are unremarkable. 





SINUSES AND MASTOIDS: 


Mild bilateral ethmoid sinusitis. 


The remaining visualized paranasal sinuses and mastoid air cells are clear. 





BONES: 


No fracture. 





IMPRESSION: 


Mild bilateral ethmoid sinusitis. 


No acute intracranial abnormality.





Disposition





- Disposition


Disposition: HOME/ ROUTINE


Disposition Time: 20:20


Condition: STABLE


Additional Instructions: 


return to er with worsening symptoms or concerns. 


Instructions:  Headache, Adult, High Blood Pressure in Adults


Forms:  [employer] (English)





- Clinical Impression


Clinical Impression: 


 Hypertension, Headache








- Scribe Statement


The provider has reviewed the documentation as recorded by the Arnulfo Jhaveri








All medical record entries made by the Arnulfo were at my direction and 

personally dictated by me. I have reviewed the chart and agree that the record 

accurately reflects my personal performance of the history, physical exam, 

medical decision making, and the department course for this patient. I have also

personally directed, reviewed, and agree with the discharge instructions and 

disposition.

## 2018-09-27 ENCOUNTER — HOSPITAL ENCOUNTER (EMERGENCY)
Dept: HOSPITAL 31 - C.ER | Age: 58
LOS: 1 days | Discharge: HOME | End: 2018-09-28
Payer: COMMERCIAL

## 2018-09-27 DIAGNOSIS — I10: ICD-10-CM

## 2018-09-27 DIAGNOSIS — R51: Primary | ICD-10-CM

## 2018-09-27 PROCEDURE — 96372 THER/PROPH/DIAG INJ SC/IM: CPT

## 2018-09-27 PROCEDURE — 99284 EMERGENCY DEPT VISIT MOD MDM: CPT

## 2018-09-27 NOTE — CT
Date of service: 



09/26/2018



PROCEDURE:  CT HEAD WITHOUT CONTRAST.



HISTORY:

ha



COMPARISON:

Comparison chest 07/18/2013



TECHNIQUE:

Axial computed tomography images were obtained through the head/brain 

without intravenous contrast.  



Radiation dose:



Total exam DLP = 989.86 mGy-cm.



This CT exam was performed using one or more of the following dose 

reduction techniques: Automated exposure control, adjustment of the 

mA and/or kV according to patient size, and/or use of iterative 

reconstruction technique.



FINDINGS:



HEMORRHAGE:

No intracranial hemorrhage. 



BRAIN:

Minimal chronic periventricular white matter ischemic changes seen 

extending slightly into the deep white matter both cerebral 

hemispheres.  No evidence of large acute infarct.



No obvious parenchymal nor extra-axial mass or collection is 

identified on this noncontrast exam.



Mild generalized volume loss.







VENTRICLES:

No obstructive hydrocephalus.  Incidental note made of a small cavum 

velum interpositum. 



CALVARIUM:

Unremarkable.



PARANASAL SINUSES:

Unremarkable as visualized. No significant inflammatory changes.



MASTOID AIR CELLS:

Unremarkable as visualized. No inflammatory changes.



OTHER FINDINGS:

None.



IMPRESSION:

No acute intracranial hemorrhage.  Minimal chronic periventricular 

white matter ischemic changes.

## 2018-09-28 VITALS
DIASTOLIC BLOOD PRESSURE: 80 MMHG | HEART RATE: 60 BPM | TEMPERATURE: 98.1 F | SYSTOLIC BLOOD PRESSURE: 142 MMHG | RESPIRATION RATE: 18 BRPM

## 2018-09-28 VITALS — OXYGEN SATURATION: 98 %

## 2018-09-28 NOTE — C.PDOC
History Of Present Illness


57 y/o male presents  to the ED complaining a headache that has been ongoing for

several day. The patient was seen in the ED yesterday for the same complaint and

has an MRI scheduled for Monday. He was advised to return to the ER if his 

symptoms worsen, prompting ED visit. Upon review of previous charts, CT scan and

blood work show no abnormalities. The patient denies any photophobia, nausea, 

vomiting, fever, recent travels or sick contacts. 


Time Seen by Provider: 09/28/18 00:37


Chief Complaint (Nursing): Headache


History Per: Patient


History/Exam Limitations: no limitations


Onset/Duration Of Symptoms: Days


Current Symptoms Are (Timing): Still Present


Associated Symptoms: denies: Photophobia, Nausea, Vomiting


Recent travel outside of the [place]: No





Past Medical History


Reviewed: Historical Data, Nursing Documentation, Vital Signs


Vital Signs: 





                                Last Vital Signs











Temp  98.2 F   09/28/18 00:00


 


Pulse  58 L  09/28/18 00:00


 


Resp  14   09/28/18 00:00


 


BP  149/85   09/28/18 00:00


 


Pulse Ox  98   09/28/18 00:00














- Medical History


PMH: HTN


Other Surgeries: Abdominal surgery


Family History: States: Unknown Family Hx





- Social History


Hx Alcohol Use: Yes


Hx Substance Use: No





- Immunization History


Hx Tetanus Toxoid Vaccination: No


Hx Influenza Vaccination: No


Hx Pneumococcal Vaccination: No





Review Of Systems


Except As Marked, All Systems Reviewed And Found Negative.


Constitutional: Negative for: Fever


Eyes: Negative for: Vision Change


Gastrointestinal: Negative for: Nausea, Vomiting


Neurological: Positive for: Headache





Physical Exam





- Physical Exam


Appears: Well, Non-toxic, No Acute Distress


Skin: Normal Color, Warm, Dry


Head: Atraumatic, Normacephalic


Eye(s): bilateral: PERRL, EOMI


Ear(s): Bilateral: Normal


Oral Mucosa: Moist


Chest: Symmetrical


Cardiovascular: Rhythm Regular, No Murmur


Respiratory: Normal Breath Sounds, No Rales, No Rhonchi, No Wheezing


Gastrointestinal/Abdominal: Soft, No Tenderness, No Distention


Extremity: Normal ROM


Extremity: Bilateral: Normal Color And Temperature, Normal ROM


Neurological/Psych: Oriented x3, Normal Speech





ED Course And Treatment


O2 Sat by Pulse Oximetry: 98 (RA)


Pulse Ox Interpretation: Normal


Progress Note: Patient is requesting MRI of the brain to be done tonite, 

explained about the procedure not available at night time and he needs ff up by 

Dr. Webb-neurology and keep appt for MRI as scheduled.





Medical Decision Making


Medical Decision Making: 


Impression: 57 y/o male presents  to the ED complaining a headache that has been

ongoing for several days





Plan: 


-Toradol 60 mg 








0150-Upon reevaluation patient is resting comfortably and reports improvement of

symptoms. The patient was advised to follow up with MRI. 








Disposition


Counseled Patient/Family Regarding: Diagnosis





- Disposition


Referrals: 


Gera Webb MD [Staff Provider] - 


Disposition: HOME/ ROUTINE


Disposition Time: 01:48


Condition: IMPROVED


Prescriptions: 


traMADol/Acetaminophen [Ultracet 325 MG-37.5 MG] 1 tab PO Q4 #14 tab


Instructions:  Headache, Adult


Forms:  [facility] (English)





- POA


Present On Arrival: None





- Clinical Impression


Clinical Impression: 


 Headache








- PA / NP / Resident Statement


MD/DO has reviewed & agrees with the documentation as recorded.





- Scribe Statement


The provider has reviewed the documentation as recorded by the Scribe (Tana Bermudez)


Provider Attestation: 


All medical record entries made by the Scribe were at my direction and 

personally dictated by me. I have reviewed the chart and agree that the record 

accurately reflects my personal performance of the history, physical exam, 

medical decision making, and the department course for this patient. I have also

personally directed, reviewed, and agree with the discharge instructions and 

disposition.

## 2018-09-29 NOTE — CARD
--------------- APPROVED REPORT --------------





Date of service: 09/26/2018



EKG Measurement

Heart [id number]

SD 156P-10

[id number]

[id number]

[id number]



<Conclusion>

Normal sinus rhythm

Moderate voltage criteria for LVH, may be normal variant

Borderline ECG

baseline artifact

please repeat.

## 2022-01-21 NOTE — CARD
--------------- APPROVED REPORT --------------





Date of service: 07/16/2018



EKG Measurement

Heart [id number]

VA [id number]

[id number]

[id number]

[id number]



<Conclusion>

Normal sinus rhythm

Moderate voltage criteria for LVH, may be normal variant

Nonspecific T wave abnormality He should follow-up with his primary care physician about the headaches.

## 2023-07-03 NOTE — RAD
Date of service: 



07/18/2018



PROCEDURE:  CHEST RADIOGRAPH, 1 VIEW



HISTORY:

SOB



COMPARISON:

None available.



FINDINGS:



LUNGS:

Clear.



PLEURA:

No pneumothorax or pleural fluid seen.



CARDIOVASCULAR:

Heart size upper limits of normal. TheNormal.



OSSEOUS STRUCTURES:

No significant abnormalities.



VISUALIZED UPPER ABDOMEN:

Normal.



OTHER FINDINGS:

None. 



IMPRESSION:

No active disease.
Fannie Muse

## 2023-10-23 NOTE — CT
Detail Level: Detailed PROCEDURE:  CTA HEAD AND NECK WITH CONTRAST



HISTORY:

new CVA



COMPARISON:

None available. 



TECHNIQUE:

Initial noncontrast head CT was performed. Subsequently, CT angiogram 

of the head and neck were performed after the intravenous 

administration of 80 mL of Omnipaque 350.  Contiguous 1.5mm thick 

images were obtained in the axial plane of the neck. 2-D coronal and 

sagittal MPR images were obtained. Imaging postprocessing was 

performed with 3-D images also obtained. A delayed contrast head CT 

was also obtained. This CT exam was performed using one or more of 

the following dose reduction techniques: Automated exposure control, 

adjustment of the mA and/or kV according to patient size, and/or use 

of iterative reconstruction technique.



Contrast dose: 100 cc Visipaque 320



Radiation dose:



Total exam DLP = 572.00 mGy-cm.



FINDINGS:



HEAD:

Right:



 The intracranial internal carotid artery, and anterior and middle 

cerebral arteries are widely patent. 



There is no hemodynamically significant stenosis in the internal 

carotid artery.



Left:



The intracranial internal carotid artery, and anterior and middle 

cerebral arteries are widely patent. 



There is no hemodynamically significant stenosis in the internal 

carotid artery.



Posterior circulation: 



The visualized intracranial vertebral arteries, basilar artery and 

posterior cerebral arteries are widely patent. There is fetal origin 

of the left posterior cerebral artery, an anatomic variant. 



There is no endoluminal filling defect to suggest thrombus. There is 

no intracranial saccular aneurysm.



There is no abnormal enhancement on the postcontrast CT. 



NECK:

There is a three vessel aortic arch. There is no stenosis at the 

origins of the great vessels at the level of the aortic arch.



Right Carotid:



On the right, the common carotid, internal carotid and external 

carotid arteries are widely patent.



There is no hemodynamically significant stenosis in the internal 

carotid artery by NASCET criteria.



Left Carotid:



On the left, the common carotid, internal carotid and external 

carotid arteries are widely patent. There are calcified 

atherosclerotic plaques in the proximal internal carotid artery. 

There is no hemodynamically significant stenosis in the internal 

carotid artery by NASCET criteria.



The vertebral arteries are widely patent and codominant.



The visualized soft tissues of the neck are normal. The visualized 

brain and cervical spine are within normal limits.



The lung apices are clear. 



IMPRESSION:

1. No evidence of endoluminal thrombus, definite significant stenosis 

or occlusion in the intracranial arteries.



2. No evidence of hemodynamically significant stenosis in the 

internal carotid arteries.



3. Patent codominant bilateral vertebral arteries.